# Patient Record
Sex: MALE | Race: BLACK OR AFRICAN AMERICAN | NOT HISPANIC OR LATINO | Employment: PART TIME | URBAN - METROPOLITAN AREA
[De-identification: names, ages, dates, MRNs, and addresses within clinical notes are randomized per-mention and may not be internally consistent; named-entity substitution may affect disease eponyms.]

---

## 2019-06-27 ENCOUNTER — HOSPITAL ENCOUNTER (INPATIENT)
Facility: HOSPITAL | Age: 39
LOS: 2 days | Discharge: HOME | DRG: 065 | End: 2019-06-29
Attending: EMERGENCY MEDICINE | Admitting: HOSPITALIST
Payer: MEDICAID

## 2019-06-27 DIAGNOSIS — R20.0 LEFT ARM NUMBNESS: ICD-10-CM

## 2019-06-27 DIAGNOSIS — R07.9 CHEST PAIN: ICD-10-CM

## 2019-06-27 DIAGNOSIS — Z79.4 TYPE 2 DIABETES MELLITUS WITH HYPEROSMOLARITY WITHOUT COMA, WITH LONG-TERM CURRENT USE OF INSULIN: Primary | Chronic | ICD-10-CM

## 2019-06-27 DIAGNOSIS — N18.30 CKD (CHRONIC KIDNEY DISEASE) STAGE 3, GFR 30-59 ML/MIN: Chronic | ICD-10-CM

## 2019-06-27 DIAGNOSIS — I50.9 HEART FAILURE: ICD-10-CM

## 2019-06-27 DIAGNOSIS — I63.511 ACUTE RIGHT MCA STROKE: ICD-10-CM

## 2019-06-27 DIAGNOSIS — E11.00 TYPE 2 DIABETES MELLITUS WITH HYPEROSMOLARITY WITHOUT COMA, WITH LONG-TERM CURRENT USE OF INSULIN: Primary | Chronic | ICD-10-CM

## 2019-06-27 LAB
ALBUMIN SERPL BCP-MCNC: 3.5 G/DL (ref 3.5–5.2)
ALP SERPL-CCNC: 85 U/L (ref 55–135)
ALT SERPL W/O P-5'-P-CCNC: 13 U/L (ref 10–44)
ANION GAP SERPL CALC-SCNC: 11 MMOL/L (ref 8–16)
AST SERPL-CCNC: 16 U/L (ref 10–40)
BASOPHILS # BLD AUTO: 0.03 K/UL (ref 0–0.2)
BASOPHILS NFR BLD: 0.5 % (ref 0–1.9)
BILIRUB SERPL-MCNC: 0.3 MG/DL (ref 0.1–1)
BUN SERPL-MCNC: 28 MG/DL (ref 6–20)
CALCIUM SERPL-MCNC: 10.8 MG/DL (ref 8.7–10.5)
CHLORIDE SERPL-SCNC: 98 MMOL/L (ref 95–110)
CO2 SERPL-SCNC: 26 MMOL/L (ref 23–29)
CREAT SERPL-MCNC: 2.2 MG/DL (ref 0.5–1.4)
DIFFERENTIAL METHOD: ABNORMAL
EOSINOPHIL # BLD AUTO: 0.1 K/UL (ref 0–0.5)
EOSINOPHIL NFR BLD: 1.5 % (ref 0–8)
ERYTHROCYTE [DISTWIDTH] IN BLOOD BY AUTOMATED COUNT: 13.1 % (ref 11.5–14.5)
EST. GFR  (AFRICAN AMERICAN): 42 ML/MIN/1.73 M^2
EST. GFR  (NON AFRICAN AMERICAN): 36 ML/MIN/1.73 M^2
GLUCOSE SERPL-MCNC: 169 MG/DL (ref 70–110)
HCT VFR BLD AUTO: 38.9 % (ref 40–54)
HGB BLD-MCNC: 13.2 G/DL (ref 14–18)
LYMPHOCYTES # BLD AUTO: 2.1 K/UL (ref 1–4.8)
LYMPHOCYTES NFR BLD: 31.8 % (ref 18–48)
MCH RBC QN AUTO: 27.8 PG (ref 27–31)
MCHC RBC AUTO-ENTMCNC: 33.9 G/DL (ref 32–36)
MCV RBC AUTO: 82 FL (ref 82–98)
MONOCYTES # BLD AUTO: 0.5 K/UL (ref 0.3–1)
MONOCYTES NFR BLD: 6.8 % (ref 4–15)
NEUTROPHILS # BLD AUTO: 3.9 K/UL (ref 1.8–7.7)
NEUTROPHILS NFR BLD: 59.7 % (ref 38–73)
PLATELET # BLD AUTO: 299 K/UL (ref 150–350)
PMV BLD AUTO: 9.9 FL (ref 9.2–12.9)
POTASSIUM SERPL-SCNC: 3.8 MMOL/L (ref 3.5–5.1)
PROT SERPL-MCNC: 7.9 G/DL (ref 6–8.4)
RBC # BLD AUTO: 4.75 M/UL (ref 4.6–6.2)
SODIUM SERPL-SCNC: 135 MMOL/L (ref 136–145)
WBC # BLD AUTO: 6.57 K/UL (ref 3.9–12.7)

## 2019-06-27 PROCEDURE — 11000001 HC ACUTE MED/SURG PRIVATE ROOM

## 2019-06-27 PROCEDURE — 25000003 PHARM REV CODE 250: Performed by: EMERGENCY MEDICINE

## 2019-06-27 PROCEDURE — 99285 EMERGENCY DEPT VISIT HI MDM: CPT | Mod: 25

## 2019-06-27 PROCEDURE — 93010 ELECTROCARDIOGRAM REPORT: CPT | Mod: ,,, | Performed by: INTERNAL MEDICINE

## 2019-06-27 PROCEDURE — 85025 COMPLETE CBC W/AUTO DIFF WBC: CPT

## 2019-06-27 PROCEDURE — 96361 HYDRATE IV INFUSION ADD-ON: CPT

## 2019-06-27 PROCEDURE — 86703 HIV-1/HIV-2 1 RESULT ANTBDY: CPT

## 2019-06-27 PROCEDURE — 36415 COLL VENOUS BLD VENIPUNCTURE: CPT

## 2019-06-27 PROCEDURE — 80320 DRUG SCREEN QUANTALCOHOLS: CPT

## 2019-06-27 PROCEDURE — 93010 EKG 12-LEAD: ICD-10-PCS | Mod: ,,, | Performed by: INTERNAL MEDICINE

## 2019-06-27 PROCEDURE — 80307 DRUG TEST PRSMV CHEM ANLYZR: CPT

## 2019-06-27 PROCEDURE — 84100 ASSAY OF PHOSPHORUS: CPT

## 2019-06-27 PROCEDURE — 80053 COMPREHEN METABOLIC PANEL: CPT

## 2019-06-27 PROCEDURE — 93005 ELECTROCARDIOGRAM TRACING: CPT

## 2019-06-27 PROCEDURE — 81000 URINALYSIS NONAUTO W/SCOPE: CPT | Mod: 59

## 2019-06-27 PROCEDURE — 83735 ASSAY OF MAGNESIUM: CPT

## 2019-06-27 RX ORDER — LOSARTAN POTASSIUM 50 MG/1
50 TABLET ORAL
Status: ON HOLD | COMMUNITY
Start: 2019-05-02 | End: 2019-06-29 | Stop reason: SDUPTHER

## 2019-06-27 RX ORDER — INSULIN ASPART 100 [IU]/ML
15 INJECTION, SOLUTION INTRAVENOUS; SUBCUTANEOUS
Status: ON HOLD | COMMUNITY
Start: 2019-05-20 | End: 2019-06-29 | Stop reason: SDUPTHER

## 2019-06-27 RX ORDER — FUROSEMIDE 40 MG/1
40 TABLET ORAL
Status: ON HOLD | COMMUNITY
Start: 2018-12-31 | End: 2019-06-29 | Stop reason: SDUPTHER

## 2019-06-27 RX ORDER — ATORVASTATIN CALCIUM 20 MG/1
20 TABLET, FILM COATED ORAL
Status: ON HOLD | COMMUNITY
Start: 2019-05-06 | End: 2019-06-29 | Stop reason: SDUPTHER

## 2019-06-27 RX ORDER — AMLODIPINE BESYLATE 10 MG/1
10 TABLET ORAL
Status: ON HOLD | COMMUNITY
Start: 2019-05-02 | End: 2019-06-29 | Stop reason: SDUPTHER

## 2019-06-27 RX ORDER — ASPIRIN 81 MG/1
81 TABLET ORAL
COMMUNITY
Start: 2018-12-14

## 2019-06-27 RX ORDER — CARVEDILOL 25 MG/1
25 TABLET ORAL
Status: ON HOLD | COMMUNITY
Start: 2019-05-02 | End: 2019-06-29 | Stop reason: SDUPTHER

## 2019-06-27 RX ORDER — HYDROCHLOROTHIAZIDE 12.5 MG/1
12.5 CAPSULE ORAL DAILY
COMMUNITY

## 2019-06-27 RX ADMIN — SODIUM CHLORIDE 1000 ML: 0.9 INJECTION, SOLUTION INTRAVENOUS at 11:06

## 2019-06-28 PROBLEM — I27.20 PULMONARY HYPERTENSION: Status: ACTIVE | Noted: 2018-12-12

## 2019-06-28 PROBLEM — I63.511 ACUTE RIGHT MCA STROKE: Status: ACTIVE | Noted: 2019-06-28

## 2019-06-28 PROBLEM — I63.9 CVA (CEREBROVASCULAR ACCIDENT): Status: ACTIVE | Noted: 2019-06-28

## 2019-06-28 PROBLEM — R07.9 CHEST PAIN: Status: ACTIVE | Noted: 2019-06-28

## 2019-06-28 PROBLEM — R20.0 LEFT ARM NUMBNESS: Status: ACTIVE | Noted: 2019-06-28

## 2019-06-28 PROBLEM — I50.22 CHRONIC SYSTOLIC CONGESTIVE HEART FAILURE: Status: ACTIVE | Noted: 2018-12-12

## 2019-06-28 PROBLEM — G93.40 ENCEPHALOPATHY: Status: ACTIVE | Noted: 2019-06-28

## 2019-06-28 PROBLEM — E55.9 VITAMIN D DEFICIENCY: Status: ACTIVE | Noted: 2017-04-10

## 2019-06-28 PROBLEM — N18.30 CKD (CHRONIC KIDNEY DISEASE) STAGE 3, GFR 30-59 ML/MIN: Status: ACTIVE | Noted: 2019-06-28

## 2019-06-28 PROBLEM — I50.22 CHRONIC SYSTOLIC CONGESTIVE HEART FAILURE: Chronic | Status: ACTIVE | Noted: 2018-12-12

## 2019-06-28 PROBLEM — N18.30 CKD (CHRONIC KIDNEY DISEASE) STAGE 3, GFR 30-59 ML/MIN: Chronic | Status: ACTIVE | Noted: 2019-06-28

## 2019-06-28 PROBLEM — I10 ESSENTIAL HYPERTENSION: Chronic | Status: ACTIVE | Noted: 2018-12-20

## 2019-06-28 PROBLEM — E11.9 TYPE 2 DIABETES MELLITUS: Status: ACTIVE | Noted: 2019-06-28

## 2019-06-28 PROBLEM — I10 ESSENTIAL HYPERTENSION: Status: ACTIVE | Noted: 2018-12-20

## 2019-06-28 LAB
ALBUMIN SERPL BCP-MCNC: 3.2 G/DL (ref 3.5–5.2)
AMPHET+METHAMPHET UR QL: NEGATIVE
ANION GAP SERPL CALC-SCNC: 12 MMOL/L (ref 8–16)
BACTERIA #/AREA URNS HPF: NORMAL /HPF
BARBITURATES UR QL SCN>200 NG/ML: NEGATIVE
BASOPHILS # BLD AUTO: 0.02 K/UL (ref 0–0.2)
BASOPHILS NFR BLD: 0.3 % (ref 0–1.9)
BENZODIAZ UR QL SCN>200 NG/ML: NEGATIVE
BILIRUB UR QL STRIP: NEGATIVE
BUN SERPL-MCNC: 27 MG/DL (ref 6–20)
BZE UR QL SCN: NEGATIVE
CALCIUM SERPL-MCNC: 10.2 MG/DL (ref 8.7–10.5)
CANNABINOIDS UR QL SCN: NEGATIVE
CHLORIDE SERPL-SCNC: 101 MMOL/L (ref 95–110)
CHOLEST SERPL-MCNC: 219 MG/DL (ref 120–199)
CHOLEST/HDLC SERPL: 8.8 {RATIO} (ref 2–5)
CLARITY UR: CLEAR
CO2 SERPL-SCNC: 25 MMOL/L (ref 23–29)
COLOR UR: YELLOW
CREAT SERPL-MCNC: 2 MG/DL (ref 0.5–1.4)
CREAT UR-MCNC: 32.4 MG/DL (ref 23–375)
DIASTOLIC DYSFUNCTION: YES
DIFFERENTIAL METHOD: ABNORMAL
EOSINOPHIL # BLD AUTO: 0.1 K/UL (ref 0–0.5)
EOSINOPHIL NFR BLD: 1 % (ref 0–8)
ERYTHROCYTE [DISTWIDTH] IN BLOOD BY AUTOMATED COUNT: 13.1 % (ref 11.5–14.5)
EST. GFR  (AFRICAN AMERICAN): 47 ML/MIN/1.73 M^2
EST. GFR  (NON AFRICAN AMERICAN): 41 ML/MIN/1.73 M^2
ESTIMATED AVG GLUCOSE: 232 MG/DL (ref 68–131)
ETHANOL SERPL-MCNC: <10 MG/DL
GLUCOSE SERPL-MCNC: 139 MG/DL (ref 70–110)
GLUCOSE UR QL STRIP: ABNORMAL
HBA1C MFR BLD HPLC: 9.7 % (ref 4–5.6)
HCT VFR BLD AUTO: 37.8 % (ref 40–54)
HDLC SERPL-MCNC: 25 MG/DL (ref 40–75)
HDLC SERPL: 11.4 % (ref 20–50)
HGB BLD-MCNC: 12.4 G/DL (ref 14–18)
HGB UR QL STRIP: ABNORMAL
HIV 1+2 AB+HIV1 P24 AG SERPL QL IA: NEGATIVE
HYALINE CASTS #/AREA URNS LPF: 0 /LPF
KETONES UR QL STRIP: NEGATIVE
LDLC SERPL CALC-MCNC: 160 MG/DL (ref 63–159)
LEUKOCYTE ESTERASE UR QL STRIP: NEGATIVE
LYMPHOCYTES # BLD AUTO: 2.1 K/UL (ref 1–4.8)
LYMPHOCYTES NFR BLD: 34.1 % (ref 18–48)
MAGNESIUM SERPL-MCNC: 1.4 MG/DL (ref 1.6–2.6)
MAGNESIUM SERPL-MCNC: 1.7 MG/DL (ref 1.6–2.6)
MCH RBC QN AUTO: 27.3 PG (ref 27–31)
MCHC RBC AUTO-ENTMCNC: 32.8 G/DL (ref 32–36)
MCV RBC AUTO: 83 FL (ref 82–98)
METHADONE UR QL SCN>300 NG/ML: NEGATIVE
MICROSCOPIC COMMENT: NORMAL
MITRAL VALVE MOBILITY: NORMAL
MONOCYTES # BLD AUTO: 0.4 K/UL (ref 0.3–1)
MONOCYTES NFR BLD: 7.2 % (ref 4–15)
NEUTROPHILS # BLD AUTO: 3.5 K/UL (ref 1.8–7.7)
NEUTROPHILS NFR BLD: 57.7 % (ref 38–73)
NITRITE UR QL STRIP: NEGATIVE
NONHDLC SERPL-MCNC: 194 MG/DL
OPIATES UR QL SCN: NEGATIVE
PCP UR QL SCN>25 NG/ML: NEGATIVE
PH UR STRIP: 6 [PH] (ref 5–8)
PHOSPHATE SERPL-MCNC: 2.7 MG/DL (ref 2.7–4.5)
PHOSPHATE SERPL-MCNC: 3.9 MG/DL (ref 2.7–4.5)
PHOSPHATE SERPL-MCNC: 3.9 MG/DL (ref 2.7–4.5)
PLATELET # BLD AUTO: 245 K/UL (ref 150–350)
PMV BLD AUTO: 10 FL (ref 9.2–12.9)
POCT GLUCOSE: 307 MG/DL (ref 70–110)
POCT GLUCOSE: 341 MG/DL (ref 70–110)
POTASSIUM SERPL-SCNC: 4 MMOL/L (ref 3.5–5.1)
PROT UR QL STRIP: ABNORMAL
RBC # BLD AUTO: 4.55 M/UL (ref 4.6–6.2)
RBC #/AREA URNS HPF: 1 /HPF (ref 0–4)
RETIRED EF AND QEF - SEE NOTES: 60 (ref 55–65)
SODIUM SERPL-SCNC: 138 MMOL/L (ref 136–145)
SP GR UR STRIP: 1.01 (ref 1–1.03)
SQUAMOUS #/AREA URNS HPF: 1 /HPF
T4 FREE SERPL-MCNC: 1.08 NG/DL (ref 0.71–1.51)
TOXICOLOGY INFORMATION: NORMAL
TRIGL SERPL-MCNC: 170 MG/DL (ref 30–150)
TSH SERPL DL<=0.005 MIU/L-ACNC: 0.08 UIU/ML (ref 0.4–4)
URN SPEC COLLECT METH UR: ABNORMAL
UROBILINOGEN UR STRIP-ACNC: NEGATIVE EU/DL
WBC # BLD AUTO: 6.07 K/UL (ref 3.9–12.7)
WBC #/AREA URNS HPF: 0 /HPF (ref 0–5)

## 2019-06-28 PROCEDURE — 99233 SBSQ HOSP IP/OBS HIGH 50: CPT | Mod: ,,, | Performed by: PSYCHIATRY & NEUROLOGY

## 2019-06-28 PROCEDURE — 96376 TX/PRO/DX INJ SAME DRUG ADON: CPT | Performed by: EMERGENCY MEDICINE

## 2019-06-28 PROCEDURE — 80069 RENAL FUNCTION PANEL: CPT

## 2019-06-28 PROCEDURE — 83735 ASSAY OF MAGNESIUM: CPT

## 2019-06-28 PROCEDURE — 63600175 PHARM REV CODE 636 W HCPCS: Performed by: NURSE PRACTITIONER

## 2019-06-28 PROCEDURE — C8929 TTE W OR WO FOL WCON,DOPPLER: HCPCS

## 2019-06-28 PROCEDURE — 83036 HEMOGLOBIN GLYCOSYLATED A1C: CPT

## 2019-06-28 PROCEDURE — 84439 ASSAY OF FREE THYROXINE: CPT

## 2019-06-28 PROCEDURE — 96372 THER/PROPH/DIAG INJ SC/IM: CPT | Mod: 59 | Performed by: EMERGENCY MEDICINE

## 2019-06-28 PROCEDURE — 92610 EVALUATE SWALLOWING FUNCTION: CPT

## 2019-06-28 PROCEDURE — 21400001 HC TELEMETRY ROOM

## 2019-06-28 PROCEDURE — 25000003 PHARM REV CODE 250: Performed by: NURSE PRACTITIONER

## 2019-06-28 PROCEDURE — 25000003 PHARM REV CODE 250: Performed by: HOSPITALIST

## 2019-06-28 PROCEDURE — 96374 THER/PROPH/DIAG INJ IV PUSH: CPT | Performed by: EMERGENCY MEDICINE

## 2019-06-28 PROCEDURE — 99233 PR SUBSEQUENT HOSPITAL CARE,LEVL III: ICD-10-PCS | Mod: ,,, | Performed by: PSYCHIATRY & NEUROLOGY

## 2019-06-28 PROCEDURE — 92523 SPEECH SOUND LANG COMPREHEN: CPT

## 2019-06-28 PROCEDURE — 80061 LIPID PANEL: CPT

## 2019-06-28 PROCEDURE — 93306 TTE W/DOPPLER COMPLETE: CPT | Mod: 26,,, | Performed by: INTERNAL MEDICINE

## 2019-06-28 PROCEDURE — 97116 GAIT TRAINING THERAPY: CPT

## 2019-06-28 PROCEDURE — S5571 INSULIN DISPOS PEN 3 ML: HCPCS | Performed by: NURSE PRACTITIONER

## 2019-06-28 PROCEDURE — 93306 2D ECHO WITH COLOR FLOW DOPPLER: ICD-10-PCS | Mod: 26,,, | Performed by: INTERNAL MEDICINE

## 2019-06-28 PROCEDURE — 96361 HYDRATE IV INFUSION ADD-ON: CPT | Performed by: EMERGENCY MEDICINE

## 2019-06-28 PROCEDURE — 97162 PT EVAL MOD COMPLEX 30 MIN: CPT

## 2019-06-28 PROCEDURE — 84443 ASSAY THYROID STIM HORMONE: CPT

## 2019-06-28 PROCEDURE — 85025 COMPLETE CBC W/AUTO DIFF WBC: CPT

## 2019-06-28 PROCEDURE — 36415 COLL VENOUS BLD VENIPUNCTURE: CPT

## 2019-06-28 RX ORDER — LOSARTAN POTASSIUM 50 MG/1
50 TABLET ORAL DAILY
Status: DISCONTINUED | OUTPATIENT
Start: 2019-06-28 | End: 2019-06-29 | Stop reason: HOSPADM

## 2019-06-28 RX ORDER — CLOPIDOGREL BISULFATE 75 MG/1
75 TABLET ORAL DAILY
Status: DISCONTINUED | OUTPATIENT
Start: 2019-06-28 | End: 2019-06-29 | Stop reason: HOSPADM

## 2019-06-28 RX ORDER — GLUCAGON 1 MG
1 KIT INJECTION
Status: DISCONTINUED | OUTPATIENT
Start: 2019-06-28 | End: 2019-06-29 | Stop reason: HOSPADM

## 2019-06-28 RX ORDER — ACETAMINOPHEN 325 MG/1
650 TABLET ORAL EVERY 6 HOURS PRN
Status: DISCONTINUED | OUTPATIENT
Start: 2019-06-28 | End: 2019-06-29 | Stop reason: HOSPADM

## 2019-06-28 RX ORDER — CARVEDILOL 12.5 MG/1
25 TABLET ORAL 2 TIMES DAILY
Status: DISCONTINUED | OUTPATIENT
Start: 2019-06-28 | End: 2019-06-29 | Stop reason: HOSPADM

## 2019-06-28 RX ORDER — SODIUM CHLORIDE 0.9 % (FLUSH) 0.9 %
5 SYRINGE (ML) INJECTION
Status: DISCONTINUED | OUTPATIENT
Start: 2019-06-28 | End: 2019-06-29 | Stop reason: HOSPADM

## 2019-06-28 RX ORDER — HEPARIN SODIUM 5000 [USP'U]/ML
5000 INJECTION, SOLUTION INTRAVENOUS; SUBCUTANEOUS EVERY 8 HOURS
Status: DISCONTINUED | OUTPATIENT
Start: 2019-06-28 | End: 2019-06-29 | Stop reason: HOSPADM

## 2019-06-28 RX ORDER — ONDANSETRON 2 MG/ML
4 INJECTION INTRAMUSCULAR; INTRAVENOUS EVERY 8 HOURS PRN
Status: DISCONTINUED | OUTPATIENT
Start: 2019-06-28 | End: 2019-06-29 | Stop reason: HOSPADM

## 2019-06-28 RX ORDER — ASPIRIN 81 MG/1
81 TABLET ORAL DAILY
Status: DISCONTINUED | OUTPATIENT
Start: 2019-06-28 | End: 2019-06-29 | Stop reason: HOSPADM

## 2019-06-28 RX ORDER — INSULIN ASPART 100 [IU]/ML
1-10 INJECTION, SOLUTION INTRAVENOUS; SUBCUTANEOUS
Status: DISCONTINUED | OUTPATIENT
Start: 2019-06-28 | End: 2019-06-29 | Stop reason: HOSPADM

## 2019-06-28 RX ORDER — ATORVASTATIN CALCIUM 40 MG/1
80 TABLET, FILM COATED ORAL DAILY
Status: DISCONTINUED | OUTPATIENT
Start: 2019-06-29 | End: 2019-06-29 | Stop reason: HOSPADM

## 2019-06-28 RX ORDER — HYDRALAZINE HYDROCHLORIDE 20 MG/ML
10 INJECTION INTRAMUSCULAR; INTRAVENOUS EVERY 8 HOURS PRN
Status: DISCONTINUED | OUTPATIENT
Start: 2019-06-28 | End: 2019-06-28

## 2019-06-28 RX ORDER — LORAZEPAM 1 MG/1
2 TABLET ORAL ONCE
Status: DISCONTINUED | OUTPATIENT
Start: 2019-06-28 | End: 2019-06-28

## 2019-06-28 RX ORDER — IBUPROFEN 200 MG
16 TABLET ORAL
Status: DISCONTINUED | OUTPATIENT
Start: 2019-06-28 | End: 2019-06-29 | Stop reason: HOSPADM

## 2019-06-28 RX ORDER — AMLODIPINE BESYLATE 10 MG/1
10 TABLET ORAL DAILY
Status: DISCONTINUED | OUTPATIENT
Start: 2019-06-28 | End: 2019-06-29 | Stop reason: HOSPADM

## 2019-06-28 RX ORDER — FUROSEMIDE 40 MG/1
40 TABLET ORAL DAILY
Status: DISCONTINUED | OUTPATIENT
Start: 2019-06-28 | End: 2019-06-29 | Stop reason: HOSPADM

## 2019-06-28 RX ORDER — IBUPROFEN 200 MG
24 TABLET ORAL
Status: DISCONTINUED | OUTPATIENT
Start: 2019-06-28 | End: 2019-06-29 | Stop reason: HOSPADM

## 2019-06-28 RX ORDER — LOSARTAN POTASSIUM 50 MG/1
50 TABLET ORAL DAILY
Status: DISCONTINUED | OUTPATIENT
Start: 2019-06-28 | End: 2019-06-28

## 2019-06-28 RX ORDER — CARVEDILOL 12.5 MG/1
25 TABLET ORAL 2 TIMES DAILY
Status: DISCONTINUED | OUTPATIENT
Start: 2019-06-28 | End: 2019-06-28

## 2019-06-28 RX ORDER — ATORVASTATIN CALCIUM 40 MG/1
40 TABLET, FILM COATED ORAL DAILY
Status: DISCONTINUED | OUTPATIENT
Start: 2019-06-28 | End: 2019-06-28

## 2019-06-28 RX ADMIN — INSULIN ASPART 8 UNITS: 100 INJECTION, SOLUTION INTRAVENOUS; SUBCUTANEOUS at 12:06

## 2019-06-28 RX ADMIN — LOSARTAN POTASSIUM 50 MG: 50 TABLET, FILM COATED ORAL at 01:06

## 2019-06-28 RX ADMIN — ASPIRIN 81 MG: 81 TABLET, COATED ORAL at 08:06

## 2019-06-28 RX ADMIN — LORAZEPAM 1 MG: 2 INJECTION INTRAMUSCULAR; INTRAVENOUS at 09:06

## 2019-06-28 RX ADMIN — AMLODIPINE BESYLATE 10 MG: 10 TABLET ORAL at 08:06

## 2019-06-28 RX ADMIN — INSULIN ASPART 8 UNITS: 100 INJECTION, SOLUTION INTRAVENOUS; SUBCUTANEOUS at 04:06

## 2019-06-28 RX ADMIN — FUROSEMIDE 40 MG: 40 TABLET ORAL at 08:06

## 2019-06-28 RX ADMIN — HEPARIN SODIUM 5000 UNITS: 5000 INJECTION, SOLUTION INTRAVENOUS; SUBCUTANEOUS at 10:06

## 2019-06-28 RX ADMIN — HEPARIN SODIUM 5000 UNITS: 5000 INJECTION, SOLUTION INTRAVENOUS; SUBCUTANEOUS at 01:06

## 2019-06-28 RX ADMIN — CLOPIDOGREL BISULFATE 75 MG: 75 TABLET ORAL at 06:06

## 2019-06-28 RX ADMIN — CARVEDILOL 25 MG: 12.5 TABLET, FILM COATED ORAL at 01:06

## 2019-06-28 RX ADMIN — HYDRALAZINE HYDROCHLORIDE 10 MG: 20 INJECTION, SOLUTION INTRAMUSCULAR; INTRAVENOUS at 08:06

## 2019-06-28 RX ADMIN — ATORVASTATIN CALCIUM 40 MG: 40 TABLET, FILM COATED ORAL at 08:06

## 2019-06-28 RX ADMIN — CARVEDILOL 25 MG: 12.5 TABLET, FILM COATED ORAL at 08:06

## 2019-06-28 RX ADMIN — CARVEDILOL 25 MG: 12.5 TABLET, FILM COATED ORAL at 09:06

## 2019-06-28 RX ADMIN — INSULIN DETEMIR 15 UNITS: 100 INJECTION, SOLUTION SUBCUTANEOUS at 08:06

## 2019-06-28 NOTE — ASSESSMENT & PLAN NOTE
- Initial creatinine 2.2 (baseline 2-2.5).  - Avoid nephrotoxic agents.  - BP control.  - Follow labs.

## 2019-06-28 NOTE — ASSESSMENT & PLAN NOTE
- Resolved PTA.  Possibly hypertensive encephalopathy.  - Neuro checks.  - BP control.  - CT of the head showed chronic infarct.  CVA work-up as below.  - Further recs pending clinical course.

## 2019-06-28 NOTE — ASSESSMENT & PLAN NOTE
- Patient underwent CMPY work-up in 12/2018 at [facility].  LHC showed normal coronaries.  TTE showed CLVH with LVEF of 25-30%.  Patient failed to f/u with [facility] for repeat echo.  - Clinically compensated on admission.  - Continue BB, ARB, and Lasix.  - Strict I&O's, daily weights, Na/fluid restriction.  - BP control.    - Repeat 2D echo shows normal left ventricular systolic function (EF 60-65%) and impaired LV relaxation, normal LAP (grade 1 diastolic dysfunction).   -Continue current medications

## 2019-06-28 NOTE — ASSESSMENT & PLAN NOTE
- Patient underwent CMPY work-up in 12/2018 at [facility].  LHC showed normal coronaries.  TTE showed CLVH with LVEF of 25-30%.  Patient failed to f/u with [facility] for repeat echo.  - Clinically compensated on admission.  - Continue BB, ARB, and Lasix.  - Strict I&O's, daily weights, Na/fluid restriction.  - BP control.  - Repeat 2D echo.

## 2019-06-28 NOTE — ED NOTES
Consulted CLAIRE Alonzo in regards to placing CTA for CVA r/o. CLAIRE states to withhold until MD evaluates pt.

## 2019-06-28 NOTE — ED PROVIDER NOTES
"SCRIBE #1 NOTE: I, Madison Zacarias, am scribing for, and in the presence of, Jennifer Nina Do, MD. I have scribed the entire note.       History     Chief Complaint   Patient presents with    Numbness     pt reports numbness to LUE x 2 wks, causing pain when lfting arm.     Review of patient's allergies indicates:  No Known Allergies      History of Present Illness     HPI    6/27/2019, 9:37 PM  History obtained from the wife and patient      History of Present Illness: Tyrone Malhotra is a 38 y.o. male patient with a PMHx of DM and HTN who presents to the Emergency Department for evaluation of "shocking" LUE numbness and weakness which onset gradually 2 days ago. Pt is c/o pain when lifting the L arm that radiates from his L elbow to his fingertips. Wife reports abnormal behavior by the pt such as putting his socks in the freezer and running into walls. Pt states he is unable to hold things in his L hand. Wife believes sxs a related to a heat stroke after pt walked 5 minutes to work. Symptoms are constant and moderate in severity. No mitigating or exacerbating factors reported. Associated sxs include confusion and speech difficulty. Patient denies any fever, chills, SOB, CP, n/v, neck pain, neck stiffness, dizziness, HA, seizures, trouble swallowing, facial asymmetry, LOC, and all other sxs at this time. No prior Tx reported. No further complaints or concerns at this time.       Arrival mode: Personal vehicle    PCP: Primary Doctor No        Past Medical History:  Past Medical History:   Diagnosis Date    Chronic systolic heart failure     Decreased renal function     Diabetes     HTN (hypertension)     Hyperlipemia        Past Surgical History:  Past Surgical History:   Procedure Laterality Date    LEFT HEART CATHETERIZATION  2018         Family History:  History reviewed. No pertinent history.     Social History:  Social History     Tobacco Use    Smoking status: Never Smoker    Smokeless " tobacco: Never Used   Substance and Sexual Activity    Alcohol use: Never     Frequency: Never    Drug use: Never    Sexual activity: Unknown        Review of Systems     Review of Systems   Constitutional: Negative for chills and fever.   HENT: Negative for sore throat and trouble swallowing.    Respiratory: Negative for cough and shortness of breath.    Cardiovascular: Negative for chest pain.   Gastrointestinal: Negative for abdominal pain, diarrhea, nausea and vomiting.   Genitourinary: Negative for dysuria.   Musculoskeletal: Negative for back pain, neck pain and neck stiffness.   Skin: Negative for rash.   Neurological: Positive for speech difficulty, weakness (LUE) and numbness (LUE). Negative for dizziness, seizures, syncope, facial asymmetry and headaches.   Hematological: Does not bruise/bleed easily.   Psychiatric/Behavioral: Positive for confusion.   All other systems reviewed and are negative.       Physical Exam     Initial Vitals [06/27/19 2052]   BP Pulse Resp Temp SpO2   (!) 186/102 94 20 98.4 °F (36.9 °C) 96 %      MAP       --          Physical Exam  Nursing Notes and Vital Signs Reviewed.  Constitutional: Patient is in no acute distress. Well-developed and well-nourished.  Head: Atraumatic. Normocephalic.  Eyes: EOM intact. Conjunctivae are not pale. No scleral icterus.  ENT: Mucous membranes are moist. Oropharynx is clear and symmetric.    Neck: Supple. Full ROM. No lymphadenopathy.  Cardiovascular: Regular rate. Regular rhythm. No murmurs, rubs, or gallops. Distal pulses are 2+ and symmetric.  Pulmonary/Chest: No respiratory distress. Clear to auscultation bilaterally. No wheezing or rales.  Abdominal: Soft and non-distended.  There is no tenderness.  No rebound, guarding, or rigidity.   Musculoskeletal: Moves all extremities. No obvious deformities. No edema. No calf tenderness.  Skin: Warm and dry.  Neurological: Patient is alert and oriented to person, place and time. Cranial nerves  "II-XII are intact. Minimal weakness to L sided face. Good  strength. L  strength weaker than R. There is no pronator drift of outstretched arms. Light touch sense is intact. Speech is clear and normal. No dysarthria. No acute focal neurological deficits noted.  Psychiatric: Normal affect. Good eye contact. Appropriate in content.     ED Course   Procedures  ED Vital Signs:  Vitals:    06/27/19 2052 06/27/19 2139 06/27/19 2140 06/27/19 2210   BP: (!) 186/102  (!) 189/107 (!) 176/89   Pulse: 94 88 84 85   Resp: 20 19 (!) 21   Temp: 98.4 °F (36.9 °C)      TempSrc: Oral      SpO2: 96%  100% 99%   Weight: (!) 156.5 kg (345 lb 0.3 oz)      Height: 5' 9" (1.753 m)       06/27/19 2220 06/27/19 2300 06/27/19 2335 06/28/19 0000   BP:  (!) 175/88 (!) 170/90 (!) 164/91   Pulse: 80 81 80 77   Resp:  19 20 19   Temp:       TempSrc:       SpO2:  99% 99% 99%   Weight:       Height:           Abnormal Lab Results:  Labs Reviewed   CBC W/ AUTO DIFFERENTIAL - Abnormal; Notable for the following components:       Result Value    Hemoglobin 13.2 (*)     Hematocrit 38.9 (*)     All other components within normal limits   COMPREHENSIVE METABOLIC PANEL - Abnormal; Notable for the following components:    Sodium 135 (*)     Glucose 169 (*)     BUN, Bld 28 (*)     Creatinine 2.2 (*)     Calcium 10.8 (*)     eGFR if  42 (*)     eGFR if non  36 (*)     All other components within normal limits   URINALYSIS, REFLEX TO URINE CULTURE - Abnormal; Notable for the following components:    Protein, UA 1+ (*)     Glucose, UA Trace (*)     Occult Blood UA Trace (*)     All other components within normal limits    Narrative:     Preferred Collection Type->Urine, Clean Catch   DRUG SCREEN PANEL, URINE EMERGENCY    Narrative:     Preferred Collection Type->Urine, Clean Catch   ALCOHOL,MEDICAL (ETHANOL)   ALCOHOL,MEDICAL (ETHANOL)   URINALYSIS MICROSCOPIC    Narrative:     Preferred Collection Type->Urine, Clean " Catch   HIV 1 / 2 ANTIBODY        All Lab Results:  Results for orders placed or performed during the hospital encounter of 06/27/19   CBC auto differential   Result Value Ref Range    WBC 6.57 3.90 - 12.70 K/uL    RBC 4.75 4.60 - 6.20 M/uL    Hemoglobin 13.2 (L) 14.0 - 18.0 g/dL    Hematocrit 38.9 (L) 40.0 - 54.0 %    Mean Corpuscular Volume 82 82 - 98 fL    Mean Corpuscular Hemoglobin 27.8 27.0 - 31.0 pg    Mean Corpuscular Hemoglobin Conc 33.9 32.0 - 36.0 g/dL    RDW 13.1 11.5 - 14.5 %    Platelets 299 150 - 350 K/uL    MPV 9.9 9.2 - 12.9 fL    Gran # (ANC) 3.9 1.8 - 7.7 K/uL    Lymph # 2.1 1.0 - 4.8 K/uL    Mono # 0.5 0.3 - 1.0 K/uL    Eos # 0.1 0.0 - 0.5 K/uL    Baso # 0.03 0.00 - 0.20 K/uL    Gran% 59.7 38.0 - 73.0 %    Lymph% 31.8 18.0 - 48.0 %    Mono% 6.8 4.0 - 15.0 %    Eosinophil% 1.5 0.0 - 8.0 %    Basophil% 0.5 0.0 - 1.9 %    Differential Method Automated    Comprehensive metabolic panel   Result Value Ref Range    Sodium 135 (L) 136 - 145 mmol/L    Potassium 3.8 3.5 - 5.1 mmol/L    Chloride 98 95 - 110 mmol/L    CO2 26 23 - 29 mmol/L    Glucose 169 (H) 70 - 110 mg/dL    BUN, Bld 28 (H) 6 - 20 mg/dL    Creatinine 2.2 (H) 0.5 - 1.4 mg/dL    Calcium 10.8 (H) 8.7 - 10.5 mg/dL    Total Protein 7.9 6.0 - 8.4 g/dL    Albumin 3.5 3.5 - 5.2 g/dL    Total Bilirubin 0.3 0.1 - 1.0 mg/dL    Alkaline Phosphatase 85 55 - 135 U/L    AST 16 10 - 40 U/L    ALT 13 10 - 44 U/L    Anion Gap 11 8 - 16 mmol/L    eGFR if African American 42 (A) >60 mL/min/1.73 m^2    eGFR if non African American 36 (A) >60 mL/min/1.73 m^2   Urinalysis, Reflex to Urine Culture Urine, Clean Catch   Result Value Ref Range    Specimen UA Urine, Clean Catch     Color, UA Yellow Yellow, Straw, Myriam    Appearance, UA Clear Clear    pH, UA 6.0 5.0 - 8.0    Specific Gravity, UA 1.010 1.005 - 1.030    Protein, UA 1+ (A) Negative    Glucose, UA Trace (A) Negative    Ketones, UA Negative Negative    Bilirubin (UA) Negative Negative    Occult Blood UA  Trace (A) Negative    Nitrite, UA Negative Negative    Urobilinogen, UA Negative <2.0 EU/dL    Leukocytes, UA Negative Negative   Drug screen panel, emergency   Result Value Ref Range    Benzodiazepines Negative     Methadone metabolites Negative     Cocaine (Metab.) Negative     Opiate Scrn, Ur Negative     Barbiturate Screen, Ur Negative     Amphetamine Screen, Ur Negative     THC Negative     Phencyclidine Negative     Creatinine, Random Ur 32.4 23.0 - 375.0 mg/dL    Toxicology Information SEE COMMENT    Ethanol   Result Value Ref Range    Alcohol, Medical, Serum <10 <10 mg/dL   Urinalysis Microscopic   Result Value Ref Range    RBC, UA 1 0 - 4 /hpf    WBC, UA 0 0 - 5 /hpf    Bacteria Rare None-Occ /hpf    Squam Epithel, UA 1 /hpf    Hyaline Casts, UA 0 0-1/lpf /lpf    Microscopic Comment SEE COMMENT        Imaging Results:  Imaging Results          CT Head Without Contrast (Final result)  Result time 06/27/19 22:21:13    Final result by Kenrick Minaya MD (06/27/19 22:21:13)                 Impression:      1. No intracranial hemorrhage or mass.  2. Right frontal lobe infarct which is either subacute or old.  Please see above discussion in the findings section.  All CT scans at this facility are performed  using dose modulation techniques as appropriate to performed exam including the following:  automated exposure control; adjustment of mA and/or kV according to the patients size (this includes techniques or standardized protocols for targeted exams where dose is matched to indication/reason for exam: i.e. extremities or head);  iterative reconstruction technique.      Electronically signed by: Kenrick Minaya MD  Date:    06/27/2019  Time:    22:21             Narrative:    EXAMINATION:  CT HEAD WITHOUT CONTRAST    CLINICAL HISTORY:  Focal neuro deficit, new, fixed or worsening, >6 hours.  Numbness of left upper extremity for 2 weeks.    TECHNIQUE:  Axial CT imaging was performed through the head without  intravenous contrast.    COMPARISON:  None    FINDINGS:  No hydrocephalus, midline shift, mass effect, or acute intracranial hemorrhage. There is a wedge-shaped area of marked decreased density in the right frontal lobe from infarction.  The age of this infarction is either subacute or old.  The frontal horn of the right lateral ventricle is asymmetrically larger suggesting perhaps ex vacuo dilatation which would make this infarct old.  Basilar cisterns and posterior fossa are normal. The visualized paranasal sinuses and mastoid air cells are clear. The skull is intact.                               X-Ray Chest PA And Lateral (Final result)  Result time 06/27/19 22:08:52    Final result by Kenrick Minaya MD (06/27/19 22:08:52)                 Impression:      Negative chest radiograph.      Electronically signed by: Kenrick Minaya MD  Date:    06/27/2019  Time:    22:08             Narrative:    EXAMINATION:  XR CHEST PA AND LATERAL    CLINICAL HISTORY:  Chest Pain;    COMPARISON:  None    FINDINGS:  The lungs are clear. The cardiac silhouette is within normal limits. No pleural effusion or pneumothorax. The bones are intact.                                 The EKG was ordered, reviewed, and independently interpreted by the ED provider.  Interpretation time: 2220  Rate: 80 BPM  Rhythm: normal sinus rhythm  Interpretation: Possible left atrial enlargement. LAD. LVH. Cannot rule out septal infarct. No STEMI.         The Emergency Provider reviewed the vital signs and test results, which are outlined above.     ED Discussion     11:25 PM: Discussed pt's case with Dr. Morales ([facility]) who recommends a drug screen.    12:10 AM: Discussed case with Dr. Morales ([facility]). Dr. Morales agrees with current care and management of pt and accepts admission.   Admitting Service: Hospital Medicine  Admitting Physician: Dr. Morales  Admit to: Obs/Tele    12:17 AM: Re-evaluated pt. I have discussed test results, shared  treatment plan, and the need for admission with patient and family at bedside. Pt and family express understanding at this time and agree with all information. All questions answered. Pt and family have no further questions or concerns at this time. Pt is ready for admit.      ED Medication(s):  Medications   sodium chloride 0.9% bolus 1,000 mL (1,000 mLs Intravenous New Bag 6/27/19 2328)   sodium chloride 0.9% bolus 1,000 mL (1,000 mLs Intravenous New Bag 6/27/19 2328)       New Prescriptions    No medications on file                 Medical Decision Making:   Clinical Tests:   Lab Tests: Ordered and Reviewed  Radiological Study: Ordered and Reviewed  Medical Tests: Ordered and Reviewed             Scribe Attestation:   Scribe #1: I performed the above scribed service and the documentation accurately describes the services I performed. I attest to the accuracy of the note.     Attending:   Physician Attestation Statement for Scribe #1: I, Jennifer Nina Do, MD, personally performed the services described in this documentation, as scribed by Madison Adames, in my presence, and it is both accurate and complete.           Clinical Impression       ICD-10-CM ICD-9-CM   1. Chest pain R07.9 786.50   2. Left arm numbness R20.0 782.0       Disposition:   Disposition: Placed in Observation  Condition: Stable         Jennifer Nina Do, MD  06/28/19 0043

## 2019-06-28 NOTE — PT/OT/SLP EVAL
Speech Language Pathology Evaluation  Cognitive/Bedside Swallow    Patient Name:  Tyrone Malhotra   MRN:  [id number]  Admitting Diagnosis: Acute right MCA stroke    Recommendations:                  General Recommendations:  Speech/language therapy  Diet recommendations:  Regular, Thin   Aspiration Precautions: HOB to 90 degrees and Remain upright 30 minutes post meal   General Precautions: Standard, fall  Communication strategies:  none    History:     Past Medical History:   Diagnosis Date    CHF (congestive heart failure)     Chronic systolic heart failure     Decreased renal function     Diabetes     HTN (hypertension)     Hyperlipemia     Hyperlipidemia     Noncompliance     Obesity     Pulmonary hypertension        Past Surgical History:   Procedure Laterality Date    CARDIAC CATHETERIZATION  12/2018    Normal coronaries    LEFT HEART CATHETERIZATION  2018       Social History: Patient lives with his wife and reports being Independent with ADLs.  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  He has no h/o dysphagia and eating a regular diet PTA      Subjective     Pt was seen at bedside with his wife present.   Patient goals: none stated     Pain/Comfort:  · Pain Rating 1: 0/10    Objective:     Cognitive Status:    WFL      Receptive Language:   Comprehension:      WFL    Pragmatics:    WFL    Expressive Language:  Verbal:    WFL     Motor Speech:  slightly slurred with speech intelligibility at 90% in unknown context     Voice:   WFL    Visual-Spatial:  WFL      Oral Musculature Evaluation  · Oral Musculature: facial asymmetry present(left facial droop)  · Dentition: present and adequate    Bedside Swallow Eval:   Consistencies Assessed:  · Thin liquids and solids     Oral Phase:   · WFL    Pharyngeal Phase:   · no overt clinical signs/symptoms of aspiration  · no overt clinical signs/symptoms of pharyngeal dysphagia      Assessment:     Tyrone Malhotra is a 38 y.o. male with an SLP  diagnosis of left sided facial droop and Dysarthria.  ST completed eval with pt exhibiting a slight left sided facial droop and slight slurred speech.  There is no evidence of dysphagia, aphasia, or cognitive deficits at this time.     Pt will benefit from ST for stated deficits.     Goals:   Multidisciplinary Problems     SLP Goals        Problem: SLP Goal    Goal Priority Disciplines Outcome   SLP Goal     SLP Ongoing (interventions implemented as appropriate)   Description:  1. Pt will perform oral ex's Independently for increased ROM                    Plan:     · Patient to be seen:  2 x/week   · Plan of Care expires:  07/02/19  · Plan of Care reviewed with:    patient  · SLP Follow-Up:  Yes      Time Tracking:     SLP Treatment Date:   06/28/19  Speech Start Time:  1400  Speech Stop Time:  1425     Speech Total Time (min):  25 min    Billable Minutes: Eval 15  and Eval Swallow and Oral Function 10    Bonita Arce CCC-SLP  06/28/2019

## 2019-06-28 NOTE — PLAN OF CARE
Problem: SLP Goal  Goal: SLP Goal  1. Pt will perform oral ex's Independently for increased ROM  Outcome: Ongoing (interventions implemented as appropriate)  ST completed eval with pt exhibiting a slight left sided facial droop and slight slurred speech.  There is no evidence of dysphagia, aphasia, or cognitive deficits at this time.

## 2019-06-28 NOTE — ASSESSMENT & PLAN NOTE
- Patient's LUE pain and weakness is more consistent with musculoskeletal etiology/epicondylitis.  - CT of the head showed right frontal lobe infarct, most likely old.   - Will obtain MRI of brain, carotid US, and 2D echo.  - Check FLP and HbA1c.  - Continue ASA and statin therapy.

## 2019-06-28 NOTE — PROGRESS NOTES
"[facility] Hospital Medicine  Progress Note    Patient Name: Tyrone Malhotra  MRN: [id number]  Patient Class: IP- Inpatient   Admission Date: 6/27/2019  Length of Stay: 0 days  Attending Physician: Robbie Oswald MD  Primary Care Provider: Primary Doctor No        Subjective:     Principal Problem:Acute right MCA stroke      HPI:  Mr. Malhotra is a 38 y.o. male with a PMHx of uncontrolled HTN, nonischemic dilated CMPY, chronic systolic HFrEF of 25-30%, HLD, DM II, CKD, PHTN, obesity and noncompliance.  He presented to the ED with c/o LUE numbness that he describes as a "shocking" sensation, and LUE weakness  x 2 days.  Patient reports pain when lifting left arm that radiates from elbow to fingertips.  He states he is unable to hold anything in his left hand due to "shocking" pain and weakness.  Wife believes patient suffered a heat stroke while walking to work a few days ago.  Wife reports abnormal behavior by the patient, such as putting his socks in the freezer, running into walls, and garbled speech.  No aggravating or alleviating factors.  Symptoms resolved PTA to ED.  Denies any  HA, visual disturbance, lightheadedness, dizziness, syncope, facial droop, drooling, aphasia, dysphagia, seizures, CP, palpitations, SOB, edema, ABD pain, N/V/D, dysuria, back or neck pain, fever or chills.  Patient hypertensive in ED with /112.  Initial work-up resulted cr. 2.2, BUN 28, normal WBC, CXR unremarkable, EKG unrevealing.  CT of the head showed no acute process, right frontal lobe infarct which is either subacute or old.  Hospital Medicine was called for admission.           Overview/Hospital Course:  Mr Malhotra is a 38 year old male who presented with LUE numbness and weakness X 2 days prior to admission. Symptoms have now improved. CT of the head showed no acute process, right frontal lobe infarct either subacute or old. MRI of the brain showed foci of acute infarction within the right " MCA distribution involving the right frontal lobe. Neurology consulted, patient was evaluated by Dr Styles this AM, who recommend MRA of the head and neck for further evaluation, continue ASA and Statin.     Interval History:  MRI showed foci of acute infarction within the right MCA distribution involving the right frontal lobe. Neurology consulted, recommends noted.     Review of Systems   Constitutional: Negative for chills, diaphoresis, fatigue and fever.   HENT: Negative for congestion, ear discharge, rhinorrhea, sinus pressure, sore throat and trouble swallowing.    Eyes: Negative for discharge and visual disturbance.   Respiratory: Negative for apnea, cough, choking, chest tightness, shortness of breath, wheezing and stridor.    Cardiovascular: Negative for chest pain, palpitations and leg swelling.   Gastrointestinal: Negative for abdominal distention, abdominal pain, diarrhea, nausea and vomiting.   Endocrine: Negative for cold intolerance and heat intolerance.   Genitourinary: Negative for dysuria, frequency and hematuria.   Musculoskeletal: Negative for arthralgias, back pain, myalgias and neck pain.   Skin: Negative for pallor and rash.   Neurological: Positive for speech difficulty, weakness (left arm ) and numbness (left arm ). Negative for dizziness, seizures, syncope and headaches.   Psychiatric/Behavioral: Positive for confusion. Negative for agitation and sleep disturbance.     Objective:     Vital Signs (Most Recent):  Temp: 97.8 °F (36.6 °C) (06/28/19 1142)  Pulse: 82 (06/28/19 1142)  Resp: 18 (06/28/19 1142)  BP: (!) 192/106 (06/28/19 1142)  SpO2: 99 % (06/28/19 1142) Vital Signs (24h Range):  Temp:  [97.7 °F (36.5 °C)-98.4 °F (36.9 °C)] 97.8 °F (36.6 °C)  Pulse:  [75-94] 82  Resp:  [18-21] 18  SpO2:  [96 %-100 %] 99 %  BP: (162-204)/() 192/106     Weight: (!) 156.5 kg (345 lb 0.3 oz)  Body mass index is 50.95 kg/m².  No intake or output data in the 24 hours ending 06/28/19 1428   Physical  Exam   Constitutional: No distress.   HENT:   Mouth/Throat: No oropharyngeal exudate.   Eyes: Right eye exhibits no discharge. Left eye exhibits no discharge.   Neck: No JVD present.   Cardiovascular: Exam reveals no gallop and no friction rub.   No murmur heard.  Pulmonary/Chest: No stridor. No respiratory distress. He has no wheezes. He has no rales. He exhibits no tenderness.   Abdominal: He exhibits no distension and no mass. There is no tenderness. There is no rebound and no guarding. No hernia.   Musculoskeletal:   Mild left upper ext weakness   Neurological: He is alert.   Skin: Skin is warm and dry. He is not diaphoretic.   Psychiatric: He has a normal mood and affect.   Nursing note and vitals reviewed.      Significant Labs:   CBC:   Recent Labs   Lab 06/27/19 2144 06/28/19 0351   WBC 6.57 6.07   HGB 13.2* 12.4*   HCT 38.9* 37.8*    245     CMP:   Recent Labs   Lab 06/27/19 2144 06/28/19 0351   * 138   K 3.8 4.0   CL 98 101   CO2 26 25   * 139*   BUN 28* 27*   CREATININE 2.2* 2.0*   CALCIUM 10.8* 10.2   PROT 7.9  --    ALBUMIN 3.5 3.2*   BILITOT 0.3  --    ALKPHOS 85  --    AST 16  --    ALT 13  --    ANIONGAP 11 12   EGFRNONAA 36* 41*     Cardiac Markers: No results for input(s): CKMB, MYOGLOBIN, BNP, TROPISTAT in the last 48 hours.  Lipid Panel:   Recent Labs   Lab 06/28/19  0428   CHOL 219*   HDL 25*   LDLCALC 160.0*   TRIG 170*   CHOLHDL 11.4*     Magnesium:   Recent Labs   Lab 06/27/19 2144 06/28/19 0351   MG 1.7 1.4*     TSH:   Recent Labs   Lab 06/28/19 0351   TSH 0.080*       Significant Imaging:     Imaging Results          CT Head Without Contrast (Final result)  Result time 06/27/19 22:21:13    Final result by Kenrick Minaya MD (06/27/19 22:21:13)                 Impression:      1. No intracranial hemorrhage or mass.  2. Right frontal lobe infarct which is either subacute or old.  Please see above discussion in the findings section.  All CT scans at this facility are  performed  using dose modulation techniques as appropriate to performed exam including the following:  automated exposure control; adjustment of mA and/or kV according to the patients size (this includes techniques or standardized protocols for targeted exams where dose is matched to indication/reason for exam: i.e. extremities or head);  iterative reconstruction technique.      Electronically signed by: Kenrick Minaya MD  Date:    06/27/2019  Time:    22:21             Narrative:    EXAMINATION:  CT HEAD WITHOUT CONTRAST    CLINICAL HISTORY:  Focal neuro deficit, new, fixed or worsening, >6 hours.  Numbness of left upper extremity for 2 weeks.    TECHNIQUE:  Axial CT imaging was performed through the head without intravenous contrast.    COMPARISON:  None    FINDINGS:  No hydrocephalus, midline shift, mass effect, or acute intracranial hemorrhage. There is a wedge-shaped area of marked decreased density in the right frontal lobe from infarction.  The age of this infarction is either subacute or old.  The frontal horn of the right lateral ventricle is asymmetrically larger suggesting perhaps ex vacuo dilatation which would make this infarct old.  Basilar cisterns and posterior fossa are normal. The visualized paranasal sinuses and mastoid air cells are clear. The skull is intact.                               X-Ray Chest PA And Lateral (Final result)  Result time 06/27/19 22:08:52    Final result by Kenrick Minaya MD (06/27/19 22:08:52)                 Impression:      Negative chest radiograph.      Electronically signed by: Kenrick Minaya MD  Date:    06/27/2019  Time:    22:08             Narrative:    EXAMINATION:  XR CHEST PA AND LATERAL    CLINICAL HISTORY:  Chest Pain;    COMPARISON:  None    FINDINGS:  The lungs are clear. The cardiac silhouette is within normal limits. No pleural effusion or pneumothorax. The bones are intact.                                Assessment/Plan:      * Acute right MCA stroke  -  Admit to Inpatient  -Patient's LUE pain and weakness is more consistent with musculoskeletal etiology/epicondylitis.  - Symptoms have imporved since admission   - CT of the head showed right frontal lobe infarct, most likely old.   - MRI of brain showes Foci of acute infarction within the right MCA distribution involving the right frontal lobe.  -Carotid US showed no significant stenosis.  -2D echo normal left ventricular systolic function (EF 60-65%) and impaired LV relaxation, normal LAP .  -  HbA1c 9.7    - Continue ASA and statin therapy.  -Consult Neurology   -MRA of brain and neck   -PT/OT/Speech     MRA of brain results secure chated with Neurologist, Dr Styles, recommend starting Plavix and review case with Vascular Neurologist.   Reviewed case with Vascular Neurologist, Dr Jackson, recommend DAPT for 3 months, increase atorvastatin 80 mg, patient can follow up with Vascular Neurologist in 1-2 months.   If patient suddenly decompensates will need stat Telestroke evaluation for possible transfer.      to arrange outpatient follow up       Acute encephalopathy  - Resolved PTA, probable related to Acute Stroke on MRI   - Neuro checks.  - , BP in good range for Acute Stroke, will continue current BP meds and monitor   - CT of the head showed chronic infarct.    - Further recs pending clinical course.    Morbid obesity  Weight lost encouraged       Uncontrolled hypertension  - Resume home amlodipine, Coreg, losartan, and Lasix.  Follow BP trends and optimize regimen as needed.    -BP continues to be elevated, monitor for drops in BP in the setting of Acute Stroke     Chronic systolic HFrEF of 25-30%  - Patient underwent CMPY work-up in 12/2018 at [facility].  LHC showed normal coronaries.  TTE showed CLVH with LVEF of 25-30%.  Patient failed to f/u with [facility] for repeat echo.  - Clinically compensated on admission.  - Continue BB, ARB, and Lasix.  - Strict I&O's, daily weights, Na/fluid  restriction.  - BP control.    - Repeat 2D echo shows normal left ventricular systolic function (EF 60-65%) and impaired LV relaxation, normal LAP (grade 1 diastolic dysfunction).   -Continue current medications     CKD (chronic kidney disease) stage 3, GFR 30-59 ml/min  - Initial creatinine 2.2 (baseline 2-2.5).  - Avoid nephrotoxic agents.  - BP control.  - Follow labs.    Type 2 diabetes mellitus, with long-term current use of insulin  - Continue basal insulin.  - AccuChecks with moderate dose SSI.  - Diabetic diet.  - HbA1c 9.7      VTE Risk Mitigation (From admission, onward)        Ordered     heparin (porcine) injection 5,000 Units  Every 8 hours      06/28/19 0102     Place sequential compression device  Until discontinued      06/28/19 0102     IP VTE HIGH RISK PATIENT  Once      06/28/19 0102                Anatoliy Gutierrez NP  Department of Hospital Medicine   [facility]

## 2019-06-28 NOTE — ASSESSMENT & PLAN NOTE
- Resolved PTA, probable related to Acute Stroke on MRI   - Neuro checks.  - , BP in good range for Acute Stroke, will continue current BP meds and monitor   - CT of the head showed chronic infarct.    - Further recs pending clinical course.

## 2019-06-28 NOTE — HPI
"Mr. Malhotra is a 38 y.o. male with a PMHx of uncontrolled HTN, nonischemic dilated CMPY, chronic systolic HFrEF of 25-30%, HLD, DM II, CKD, PHTN, obesity and noncompliance.  He presented to the ED with c/o LUE numbness that he describes as a "shocking" sensation, and LUE weakness x 2 days.  Patient reports pain when lifting left arm that radiates from elbow to fingertips.  He states he is unable to hold anything in his left hand due to "shocking" pain and weakness.  Wife believes patient suffered a heat stroke while walking to work a few days ago.  Wife reports abnormal behavior by the patient, such as putting his socks in the freezer, running into walls, and garbled speech.  No aggravating or alleviating factors.  Symptoms resolved PTA to ED.  Denies any HA, visual disturbance, lightheadedness, dizziness, syncope, facial droop, drooling, aphasia, dysphagia, seizures, CP, palpitations, SOB, edema, ABD pain, N/V/D, dysuria, back or neck pain, fever or chills.  Patient hypertensive in ED with /112.  Initial work-up resulted cr. 2.2, BUN 28, normal WBC, CXR unremarkable, EKG unrevealing.  CT of the head showed no acute process, right frontal lobe infarct which is either subacute or old.  Hospital Medicine was called for admission.         "

## 2019-06-28 NOTE — ED NOTES
"Pts wife reports that 2 day ago pt was walking to work and thinks he had a "heat stroke" She reports that he has been running into walls, having memory loss, misplacing items and she noticed his speech is not as baseline. Patient states that his left arm has been numb and he cannot lift up objects with it. Upon assessment pt has asymmetrical smile, no other deficits noted.   "

## 2019-06-28 NOTE — H&P
"[facility] Hospital Medicine  History & Physical    Patient Name: Tyrone Malhotra  MRN: [id number]  Admission Date: 6/28/2019  Attending Physician: Chris Morales MD   Primary Care Provider: Primary Doctor No         Patient information was obtained from patient, spouse/SO, past medical records and ER records.     Subjective:     Principal Problem:Encephalopathy    Chief Complaint:   Chief Complaint   Patient presents with    Numbness     pt reports numbness to LUE x 2 wks, causing pain when lfting arm.        HPI: Mr. Malhotra is a 38 y.o. male with a PMHx of uncontrolled HTN, nonischemic dilated CMPY, chronic systolic HFrEF of 25-30%, HLD, DM II, CKD, PHTN, obesity and noncompliance.  He presented to the ED with c/o LUE numbness that he describes as a "shocking" sensation, and LUE weakness  x 2 days.  Patient reports pain when lifting left arm that radiates from elbow to fingertips.  He states he is unable to hold anything in his left hand due to "shocking" pain and weakness.  Wife believes patient suffered a heat stroke while walking to work a few days ago.  Wife reports abnormal behavior by the patient, such as putting his socks in the freezer, running into walls, and garbled speech.  No aggravating or alleviating factors.  Symptoms resolved PTA to ED.  Denies any  HA, visual disturbance, lightheadedness, dizziness, syncope, facial droop, drooling, aphasia, dysphagia, seizures, CP, palpitations, SOB, edema, ABD pain, N/V/D, dysuria, back or neck pain, fever or chills.  Patient hypertensive in ED with /112.  Initial work-up resulted cr. 2.2, BUN 28, normal WBC, CXR unremarkable, EKG unrevealing.  CT of the head showed no acute process, right frontal lobe infarct which is either subacute or old.  Hospital Medicine was called for admission.           Past Medical History:   Diagnosis Date    CHF (congestive heart failure)     Chronic systolic heart failure     Decreased renal " function     Diabetes     HTN (hypertension)     Hyperlipidemia     Noncompliance     Obesity     Pulmonary hypertension        Past Surgical History:   Procedure Laterality Date    CARDIAC CATHETERIZATION  12/2018    Normal coronaries    LEFT HEART CATHETERIZATION  2018       Review of patient's allergies indicates:  No Known Allergies    No current facility-administered medications on file prior to encounter.      Current Outpatient Medications on File Prior to Encounter   Medication Sig    amLODIPine (NORVASC) 10 MG tablet Take 10 mg by mouth.    aspirin (ECOTRIN) 81 MG EC tablet Take 81 mg by mouth.    atorvastatin (LIPITOR) 20 MG tablet Take 20 mg by mouth.    carvedilol (COREG) 25 MG tablet Take 25 mg by mouth.    furosemide (LASIX) 40 MG tablet Take 40 mg by mouth.    hydroCHLOROthiazide (MICROZIDE) 12.5 mg capsule Take 12.5 mg by mouth once daily.    insulin aspart U-100 (NOVOLOG FLEXPEN U-100 INSULIN) 100 unit/mL (3 mL) InPn pen Inject 15 Units into the skin.    insulin detemir U-100 (LEVEMIR U-100 INSULIN) 100 unit/mL injection Inject 28 Units into the skin.    losartan (COZAAR) 50 MG tablet Take 50 mg by mouth.     Family History     Problem Relation (Age of Onset)    Asthma Mother    Diabetes Mother, Sister    Down syndrome Daughter    Hypertension Mother, Father, Sister    Stroke Daughter        Tobacco Use    Smoking status: Never Smoker    Smokeless tobacco: Never Used   Substance and Sexual Activity    Alcohol use: Never     Frequency: Never    Drug use: Never    Sexual activity: Not on file     Review of Systems   Constitutional: Negative for activity change, chills, diaphoresis, fatigue, fever and unexpected weight change.   HENT: Negative for congestion, drooling, sore throat and trouble swallowing.    Eyes: Negative for visual disturbance.   Respiratory: Negative for cough, chest tightness, shortness of breath and wheezing.    Cardiovascular: Negative for chest pain,  palpitations and leg swelling.   Gastrointestinal: Negative for abdominal distention, abdominal pain, blood in stool, constipation, diarrhea, nausea and vomiting.   Genitourinary: Negative for decreased urine volume, difficulty urinating, dysuria, frequency, hematuria and urgency.   Musculoskeletal: Negative for arthralgias, back pain, gait problem, myalgias, neck pain and neck stiffness.   Skin: Negative for pallor, rash and wound.   Neurological: Positive for speech difficulty, weakness (LUE) and numbness (LUE). Negative for dizziness, tremors, seizures, syncope, facial asymmetry, light-headedness and headaches.   Psychiatric/Behavioral: Positive for confusion. Negative for sleep disturbance. The patient is not nervous/anxious.    All other systems reviewed and are negative.    Objective:     Vital Signs (Most Recent):  Temp: 98.4 °F (36.9 °C) (06/27/19 2052)  Pulse: 88 (06/28/19 0120)  Resp: 20 (06/28/19 0120)  BP: (!) 204/112 (06/28/19 0120)  SpO2: 100 % (06/28/19 0120) Vital Signs (24h Range):  Temp:  [98.4 °F (36.9 °C)] 98.4 °F (36.9 °C)  Pulse:  [76-94] 88  Resp:  [19-21] 20  SpO2:  [96 %-100 %] 100 %  BP: (164-204)/() 204/112     Weight: (!) 156.5 kg (345 lb 0.3 oz)  Body mass index is 50.95 kg/m².    Physical Exam   Constitutional: He is oriented to person, place, and time. He appears well-developed and well-nourished. No distress.   HENT:   Head: Normocephalic and atraumatic.   Eyes: Conjunctivae are normal.   PERRL; EOM intact.   Neck: Normal range of motion. Neck supple. No JVD present.   Cardiovascular: Normal rate, regular rhythm, S1 normal, S2 normal and intact distal pulses.  No extrasystoles are present. Exam reveals no gallop.   No murmur heard.  Pulses:       Radial pulses are 2+ on the right side, and 2+ on the left side.        Dorsalis pedis pulses are 2+ on the right side, and 2+ on the left side.        Posterior tibial pulses are 2+ on the right side, and 2+ on the left side.    Pulmonary/Chest: Effort normal and breath sounds normal. No accessory muscle usage. No tachypnea. No respiratory distress. He has no wheezes. He has no rhonchi. He has no rales.   Abdominal: Soft. Bowel sounds are normal. He exhibits no distension. There is no tenderness. There is no rebound, no guarding and no CVA tenderness.   Musculoskeletal: Normal range of motion. He exhibits no edema, tenderness or deformity.   Neurological: He is alert and oriented to person, place, and time. He has normal strength. No cranial nerve deficit or sensory deficit. Coordination and gait normal. GCS eye subscore is 4. GCS verbal subscore is 5. GCS motor subscore is 6.   Awake and alert.  Oriented to person, place, time, and situation.  Muscle strength 5/5 to all extremities, but LUE is slightly weaker than RUE.  No motor or sensory deficits.  Speech clear and normal.  No facial droop.  No acute focal neurological deficits appreciated.    Skin: Skin is warm, dry and intact. Capillary refill takes less than 2 seconds. No rash noted. He is not diaphoretic. No cyanosis or erythema.   Psychiatric: He has a normal mood and affect. His speech is normal and behavior is normal. Cognition and memory are normal.   Nursing note and vitals reviewed.          Significant Labs:  Results for orders placed or performed during the hospital encounter of 06/27/19   CBC auto differential   Result Value Ref Range    WBC 6.57 3.90 - 12.70 K/uL    RBC 4.75 4.60 - 6.20 M/uL    Hemoglobin 13.2 (L) 14.0 - 18.0 g/dL    Hematocrit 38.9 (L) 40.0 - 54.0 %    Mean Corpuscular Volume 82 82 - 98 fL    Mean Corpuscular Hemoglobin 27.8 27.0 - 31.0 pg    Mean Corpuscular Hemoglobin Conc 33.9 32.0 - 36.0 g/dL    RDW 13.1 11.5 - 14.5 %    Platelets 299 150 - 350 K/uL    MPV 9.9 9.2 - 12.9 fL    Gran # (ANC) 3.9 1.8 - 7.7 K/uL    Lymph # 2.1 1.0 - 4.8 K/uL    Mono # 0.5 0.3 - 1.0 K/uL    Eos # 0.1 0.0 - 0.5 K/uL    Baso # 0.03 0.00 - 0.20 K/uL    Gran% 59.7 38.0 - 73.0  %    Lymph% 31.8 18.0 - 48.0 %    Mono% 6.8 4.0 - 15.0 %    Eosinophil% 1.5 0.0 - 8.0 %    Basophil% 0.5 0.0 - 1.9 %    Differential Method Automated    Comprehensive metabolic panel   Result Value Ref Range    Sodium 135 (L) 136 - 145 mmol/L    Potassium 3.8 3.5 - 5.1 mmol/L    Chloride 98 95 - 110 mmol/L    CO2 26 23 - 29 mmol/L    Glucose 169 (H) 70 - 110 mg/dL    BUN, Bld 28 (H) 6 - 20 mg/dL    Creatinine 2.2 (H) 0.5 - 1.4 mg/dL    Calcium 10.8 (H) 8.7 - 10.5 mg/dL    Total Protein 7.9 6.0 - 8.4 g/dL    Albumin 3.5 3.5 - 5.2 g/dL    Total Bilirubin 0.3 0.1 - 1.0 mg/dL    Alkaline Phosphatase 85 55 - 135 U/L    AST 16 10 - 40 U/L    ALT 13 10 - 44 U/L    Anion Gap 11 8 - 16 mmol/L    eGFR if African American 42 (A) >60 mL/min/1.73 m^2    eGFR if non African American 36 (A) >60 mL/min/1.73 m^2   Urinalysis, Reflex to Urine Culture Urine, Clean Catch   Result Value Ref Range    Specimen UA Urine, Clean Catch     Color, UA Yellow Yellow, Straw, Myriam    Appearance, UA Clear Clear    pH, UA 6.0 5.0 - 8.0    Specific Gravity, UA 1.010 1.005 - 1.030    Protein, UA 1+ (A) Negative    Glucose, UA Trace (A) Negative    Ketones, UA Negative Negative    Bilirubin (UA) Negative Negative    Occult Blood UA Trace (A) Negative    Nitrite, UA Negative Negative    Urobilinogen, UA Negative <2.0 EU/dL    Leukocytes, UA Negative Negative   Drug screen panel, emergency   Result Value Ref Range    Benzodiazepines Negative     Methadone metabolites Negative     Cocaine (Metab.) Negative     Opiate Scrn, Ur Negative     Barbiturate Screen, Ur Negative     Amphetamine Screen, Ur Negative     THC Negative     Phencyclidine Negative     Creatinine, Random Ur 32.4 23.0 - 375.0 mg/dL    Toxicology Information SEE COMMENT    Ethanol   Result Value Ref Range    Alcohol, Medical, Serum <10 <10 mg/dL   Urinalysis Microscopic   Result Value Ref Range    RBC, UA 1 0 - 4 /hpf    WBC, UA 0 0 - 5 /hpf    Bacteria Rare None-Occ /hpf    Squam Epithel,  UA 1 /hpf    Hyaline Casts, UA 0 0-1/lpf /lpf    Microscopic Comment SEE COMMENT    Magnesium   Result Value Ref Range    Magnesium 1.7 1.6 - 2.6 mg/dL   Phosphorus   Result Value Ref Range    Phosphorus 2.7 2.7 - 4.5 mg/dL      All pertinent labs within the past 24 hours have been reviewed.    Significant Imaging:  Imaging Results          CT Head Without Contrast (Final result)  Result time 06/27/19 22:21:13    Final result by Kenrick Minaya MD (06/27/19 22:21:13)                 Impression:      1. No intracranial hemorrhage or mass.  2. Right frontal lobe infarct which is either subacute or old.  Please see above discussion in the findings section.  All CT scans at this facility are performed  using dose modulation techniques as appropriate to performed exam including the following:  automated exposure control; adjustment of mA and/or kV according to the patients size (this includes techniques or standardized protocols for targeted exams where dose is matched to indication/reason for exam: i.e. extremities or head);  iterative reconstruction technique.      Electronically signed by: Kenrick Minaya MD  Date:    06/27/2019  Time:    22:21             Narrative:    EXAMINATION:  CT HEAD WITHOUT CONTRAST    CLINICAL HISTORY:  Focal neuro deficit, new, fixed or worsening, >6 hours.  Numbness of left upper extremity for 2 weeks.    TECHNIQUE:  Axial CT imaging was performed through the head without intravenous contrast.    COMPARISON:  None    FINDINGS:  No hydrocephalus, midline shift, mass effect, or acute intracranial hemorrhage. There is a wedge-shaped area of marked decreased density in the right frontal lobe from infarction.  The age of this infarction is either subacute or old.  The frontal horn of the right lateral ventricle is asymmetrically larger suggesting perhaps ex vacuo dilatation which would make this infarct old.  Basilar cisterns and posterior fossa are normal. The visualized paranasal sinuses and  mastoid air cells are clear. The skull is intact.                               X-Ray Chest PA And Lateral (Final result)  Result time 06/27/19 22:08:52    Final result by Kenrick Minaya MD (06/27/19 22:08:52)                 Impression:      Negative chest radiograph.      Electronically signed by: Kenrick Minaya MD  Date:    06/27/2019  Time:    22:08             Narrative:    EXAMINATION:  XR CHEST PA AND LATERAL    CLINICAL HISTORY:  Chest Pain;    COMPARISON:  None    FINDINGS:  The lungs are clear. The cardiac silhouette is within normal limits. No pleural effusion or pneumothorax. The bones are intact.                               I have reviewed all pertinent imaging results/findings within the past 24 hours.     EKG: (personally reviewed)  Normal sinus rhythm, possible LAE, LAD, LVH.  No significant change from previous tracings at [facility].              Assessment/Plan:     * Acute encephalopathy  - Resolved PTA.  Possibly hypertensive encephalopathy.  - Neuro checks.  - BP control.  - CT of the head showed chronic infarct.  CVA work-up as below.  - Further recs pending clinical course.    Subacute or chronic stroke (cerebrum)  - Patient's LUE pain and weakness is more consistent with musculoskeletal etiology/epicondylitis.  - CT of the head showed right frontal lobe infarct, most likely old.   - Will obtain MRI of brain, carotid US, and 2D echo.  - Check FLP and HbA1c.  - Continue ASA and statin therapy.    Uncontrolled hypertension  - Resume home amlodipine, Coreg, losartan, and Lasix.  Follow BP trends and optimize regimen as needed.  Would benefit from Bidil, but unsure if financially feasible for patient.  - IV hydralazine PRN.    Chronic systolic HFrEF of 25-30%  - Patient underwent CMPY work-up in 12/2018 at [facility].  LHC showed normal coronaries.  TTE showed [facility] with LVEF of 25-30%.  Patient failed to f/u with [facility] for repeat echo.  - Clinically compensated on admission.  - Continue BB, ARB, and  Lasix.  - Strict I&O's, daily weights, Na/fluid restriction.  - BP control.  - Repeat 2D echo.    CKD (chronic kidney disease) stage 3, GFR 30-59 ml/min  - Initial creatinine 2.2 (baseline 2-2.5).  - Avoid nephrotoxic agents.  - BP control.  - Follow labs.    Type 2 diabetes mellitus, with long-term current use of insulin  - Continue basal insulin.  - AccuChecks with moderate dose SSI.  - Diabetic diet.  - Check HbA1c.      VTE Risk Mitigation (From admission, onward)        Ordered     heparin (porcine) injection 5,000 Units  Every 8 hours      06/28/19 0102     Place sequential compression device  Until discontinued      06/28/19 0102     IP VTE HIGH RISK PATIENT  Once      06/28/19 0102             Shaylee Cedillo NP  Department of Hospital Medicine   [facility]

## 2019-06-28 NOTE — NURSING
Chart review completed. Patient and person at bedside states that he does take his Novolog and Levemir as prescribed. Patient does have a meter and family checks his sugar daily. She stated that his blood sugar is about 200 at times; however, recently the readings have been 150-160. Patient and family state that he does not drink sugary drinks and drinks an adequate amount of water. Dietary restrictions discussed. Hypoglycemia prevention and treatment also discussed.  No further questions or needs at this time.

## 2019-06-28 NOTE — ASSESSMENT & PLAN NOTE
- Resume home amlodipine, Coreg, losartan, and Lasix.  Follow BP trends and optimize regimen as needed.  Would benefit from Bidil, but unsure if financially feasible for patient.  - IV hydralazine PRN.

## 2019-06-28 NOTE — SUBJECTIVE & OBJECTIVE
Past Medical History:   Diagnosis Date    CHF (congestive heart failure)     Chronic systolic heart failure     Decreased renal function     Diabetes     HTN (hypertension)     Hyperlipidemia     Noncompliance     Obesity     Pulmonary hypertension        Past Surgical History:   Procedure Laterality Date    CARDIAC CATHETERIZATION  12/2018    Normal coronaries    LEFT HEART CATHETERIZATION  2018       Review of patient's allergies indicates:  No Known Allergies    No current facility-administered medications on file prior to encounter.      Current Outpatient Medications on File Prior to Encounter   Medication Sig    amLODIPine (NORVASC) 10 MG tablet Take 10 mg by mouth.    aspirin (ECOTRIN) 81 MG EC tablet Take 81 mg by mouth.    atorvastatin (LIPITOR) 20 MG tablet Take 20 mg by mouth.    carvedilol (COREG) 25 MG tablet Take 25 mg by mouth.    furosemide (LASIX) 40 MG tablet Take 40 mg by mouth.    hydroCHLOROthiazide (MICROZIDE) 12.5 mg capsule Take 12.5 mg by mouth once daily.    insulin aspart U-100 (NOVOLOG FLEXPEN U-100 INSULIN) 100 unit/mL (3 mL) InPn pen Inject 15 Units into the skin.    insulin detemir U-100 (LEVEMIR U-100 INSULIN) 100 unit/mL injection Inject 28 Units into the skin.    losartan (COZAAR) 50 MG tablet Take 50 mg by mouth.     Family History     Problem Relation (Age of Onset)    Asthma Mother    Diabetes Mother, Sister    Down syndrome Daughter    Hypertension Mother, Father, Sister    Stroke Daughter        Tobacco Use    Smoking status: Never Smoker    Smokeless tobacco: Never Used   Substance and Sexual Activity    Alcohol use: Never     Frequency: Never    Drug use: Never    Sexual activity: Not on file     Review of Systems   Constitutional: Negative for activity change, chills, diaphoresis, fatigue, fever and unexpected weight change.   HENT: Negative for congestion, drooling, sore throat and trouble swallowing.    Eyes: Negative for visual disturbance.    Respiratory: Negative for cough, chest tightness, shortness of breath and wheezing.    Cardiovascular: Negative for chest pain, palpitations and leg swelling.   Gastrointestinal: Negative for abdominal distention, abdominal pain, blood in stool, constipation, diarrhea, nausea and vomiting.   Genitourinary: Negative for decreased urine volume, difficulty urinating, dysuria, frequency, hematuria and urgency.   Musculoskeletal: Negative for arthralgias, back pain, gait problem, myalgias, neck pain and neck stiffness.   Skin: Negative for pallor, rash and wound.   Neurological: Positive for speech difficulty, weakness (LUE) and numbness (LUE). Negative for dizziness, tremors, seizures, syncope, facial asymmetry, light-headedness and headaches.   Psychiatric/Behavioral: Positive for confusion. Negative for sleep disturbance. The patient is not nervous/anxious.    All other systems reviewed and are negative.    Objective:     Vital Signs (Most Recent):  Temp: 98.4 °F (36.9 °C) (06/27/19 2052)  Pulse: 88 (06/28/19 0120)  Resp: 20 (06/28/19 0120)  BP: (!) 204/112 (06/28/19 0120)  SpO2: 100 % (06/28/19 0120) Vital Signs (24h Range):  Temp:  [98.4 °F (36.9 °C)] 98.4 °F (36.9 °C)  Pulse:  [76-94] 88  Resp:  [19-21] 20  SpO2:  [96 %-100 %] 100 %  BP: (164-204)/() 204/112     Weight: (!) 156.5 kg (345 lb 0.3 oz)  Body mass index is 50.95 kg/m².    Physical Exam   Constitutional: He is oriented to person, place, and time. He appears well-developed and well-nourished. No distress.   HENT:   Head: Normocephalic and atraumatic.   Eyes: Conjunctivae are normal.   PERRL; EOM intact.   Neck: Normal range of motion. Neck supple. No JVD present.   Cardiovascular: Normal rate, regular rhythm, S1 normal, S2 normal and intact distal pulses.  No extrasystoles are present. Exam reveals no gallop.   No murmur heard.  Pulses:       Radial pulses are 2+ on the right side, and 2+ on the left side.        Dorsalis pedis pulses are 2+ on  the right side, and 2+ on the left side.        Posterior tibial pulses are 2+ on the right side, and 2+ on the left side.   Pulmonary/Chest: Effort normal and breath sounds normal. No accessory muscle usage. No tachypnea. No respiratory distress. He has no wheezes. He has no rhonchi. He has no rales.   Abdominal: Soft. Bowel sounds are normal. He exhibits no distension. There is no tenderness. There is no rebound, no guarding and no CVA tenderness.   Musculoskeletal: Normal range of motion. He exhibits no edema, tenderness or deformity.   Neurological: He is alert and oriented to person, place, and time. He has normal strength. No cranial nerve deficit or sensory deficit. Coordination and gait normal. GCS eye subscore is 4. GCS verbal subscore is 5. GCS motor subscore is 6.   Awake and alert.  Oriented to person, place, time, and situation.  Muscle strength 5/5 to all extremities, but LUE is slightly weaker than RUE.  No motor or sensory deficits.  Speech clear and normal.  No facial droop.  No acute focal neurological deficits appreciated.    Skin: Skin is warm, dry and intact. Capillary refill takes less than 2 seconds. No rash noted. He is not diaphoretic. No cyanosis or erythema.   Psychiatric: He has a normal mood and affect. His speech is normal and behavior is normal. Cognition and memory are normal.   Nursing note and vitals reviewed.          Significant Labs:  Results for orders placed or performed during the hospital encounter of 06/27/19   CBC auto differential   Result Value Ref Range    WBC 6.57 3.90 - 12.70 K/uL    RBC 4.75 4.60 - 6.20 M/uL    Hemoglobin 13.2 (L) 14.0 - 18.0 g/dL    Hematocrit 38.9 (L) 40.0 - 54.0 %    Mean Corpuscular Volume 82 82 - 98 fL    Mean Corpuscular Hemoglobin 27.8 27.0 - 31.0 pg    Mean Corpuscular Hemoglobin Conc 33.9 32.0 - 36.0 g/dL    RDW 13.1 11.5 - 14.5 %    Platelets 299 150 - 350 K/uL    MPV 9.9 9.2 - 12.9 fL    Gran # (ANC) 3.9 1.8 - 7.7 K/uL    Lymph # 2.1 1.0 -  4.8 K/uL    Mono # 0.5 0.3 - 1.0 K/uL    Eos # 0.1 0.0 - 0.5 K/uL    Baso # 0.03 0.00 - 0.20 K/uL    Gran% 59.7 38.0 - 73.0 %    Lymph% 31.8 18.0 - 48.0 %    Mono% 6.8 4.0 - 15.0 %    Eosinophil% 1.5 0.0 - 8.0 %    Basophil% 0.5 0.0 - 1.9 %    Differential Method Automated    Comprehensive metabolic panel   Result Value Ref Range    Sodium 135 (L) 136 - 145 mmol/L    Potassium 3.8 3.5 - 5.1 mmol/L    Chloride 98 95 - 110 mmol/L    CO2 26 23 - 29 mmol/L    Glucose 169 (H) 70 - 110 mg/dL    BUN, Bld 28 (H) 6 - 20 mg/dL    Creatinine 2.2 (H) 0.5 - 1.4 mg/dL    Calcium 10.8 (H) 8.7 - 10.5 mg/dL    Total Protein 7.9 6.0 - 8.4 g/dL    Albumin 3.5 3.5 - 5.2 g/dL    Total Bilirubin 0.3 0.1 - 1.0 mg/dL    Alkaline Phosphatase 85 55 - 135 U/L    AST 16 10 - 40 U/L    ALT 13 10 - 44 U/L    Anion Gap 11 8 - 16 mmol/L    eGFR if African American 42 (A) >60 mL/min/1.73 m^2    eGFR if non African American 36 (A) >60 mL/min/1.73 m^2   Urinalysis, Reflex to Urine Culture Urine, Clean Catch   Result Value Ref Range    Specimen UA Urine, Clean Catch     Color, UA Yellow Yellow, Straw, Myriam    Appearance, UA Clear Clear    pH, UA 6.0 5.0 - 8.0    Specific Gravity, UA 1.010 1.005 - 1.030    Protein, UA 1+ (A) Negative    Glucose, UA Trace (A) Negative    Ketones, UA Negative Negative    Bilirubin (UA) Negative Negative    Occult Blood UA Trace (A) Negative    Nitrite, UA Negative Negative    Urobilinogen, UA Negative <2.0 EU/dL    Leukocytes, UA Negative Negative   Drug screen panel, emergency   Result Value Ref Range    Benzodiazepines Negative     Methadone metabolites Negative     Cocaine (Metab.) Negative     Opiate Scrn, Ur Negative     Barbiturate Screen, Ur Negative     Amphetamine Screen, Ur Negative     THC Negative     Phencyclidine Negative     Creatinine, Random Ur 32.4 23.0 - 375.0 mg/dL    Toxicology Information SEE COMMENT    Ethanol   Result Value Ref Range    Alcohol, Medical, Serum <10 <10 mg/dL   Urinalysis  Microscopic   Result Value Ref Range    RBC, UA 1 0 - 4 /hpf    WBC, UA 0 0 - 5 /hpf    Bacteria Rare None-Occ /hpf    Squam Epithel, UA 1 /hpf    Hyaline Casts, UA 0 0-1/lpf /lpf    Microscopic Comment SEE COMMENT    Magnesium   Result Value Ref Range    Magnesium 1.7 1.6 - 2.6 mg/dL   Phosphorus   Result Value Ref Range    Phosphorus 2.7 2.7 - 4.5 mg/dL      All pertinent labs within the past 24 hours have been reviewed.    Significant Imaging:  Imaging Results          CT Head Without Contrast (Final result)  Result time 06/27/19 22:21:13    Final result by Kenrick Minaya MD (06/27/19 22:21:13)                 Impression:      1. No intracranial hemorrhage or mass.  2. Right frontal lobe infarct which is either subacute or old.  Please see above discussion in the findings section.  All CT scans at this facility are performed  using dose modulation techniques as appropriate to performed exam including the following:  automated exposure control; adjustment of mA and/or kV according to the patients size (this includes techniques or standardized protocols for targeted exams where dose is matched to indication/reason for exam: i.e. extremities or head);  iterative reconstruction technique.      Electronically signed by: Kenrick Minaya MD  Date:    06/27/2019  Time:    22:21             Narrative:    EXAMINATION:  CT HEAD WITHOUT CONTRAST    CLINICAL HISTORY:  Focal neuro deficit, new, fixed or worsening, >6 hours.  Numbness of left upper extremity for 2 weeks.    TECHNIQUE:  Axial CT imaging was performed through the head without intravenous contrast.    COMPARISON:  None    FINDINGS:  No hydrocephalus, midline shift, mass effect, or acute intracranial hemorrhage. There is a wedge-shaped area of marked decreased density in the right frontal lobe from infarction.  The age of this infarction is either subacute or old.  The frontal horn of the right lateral ventricle is asymmetrically larger suggesting perhaps ex vacuo  dilatation which would make this infarct old.  Basilar cisterns and posterior fossa are normal. The visualized paranasal sinuses and mastoid air cells are clear. The skull is intact.                               X-Ray Chest PA And Lateral (Final result)  Result time 06/27/19 22:08:52    Final result by Kenrick Minaya MD (06/27/19 22:08:52)                 Impression:      Negative chest radiograph.      Electronically signed by: Kenrick Minaya MD  Date:    06/27/2019  Time:    22:08             Narrative:    EXAMINATION:  XR CHEST PA AND LATERAL    CLINICAL HISTORY:  Chest Pain;    COMPARISON:  None    FINDINGS:  The lungs are clear. The cardiac silhouette is within normal limits. No pleural effusion or pneumothorax. The bones are intact.                               I have reviewed all pertinent imaging results/findings within the past 24 hours.     EKG: (personally reviewed)  Normal sinus rhythm, possible LAE, LAD, LVH.  No significant change from previous tracings at [facility].

## 2019-06-28 NOTE — PLAN OF CARE
Met with patient and significant other/shelby. Patient is independent with adls and iadls.  Patient requests an up-to-date BP cuff/monitor, glucometer, & needle attachments for Insulin post hospital discharge.  Updated white board with 's name and number. Transitional Care Folder, Discharge Planning Begins on Admission pamphlet, [facility] Bedside Delivery pamphlet, Advance Directive information given to patient along with the contact information given.Instructed patient or family to call with any questions or concerns.     D/C Plan:  Home  PCP:  No Primary MD listed  Preferred Pharmacy:  [facility]  Discharge transportation:  Family will provide  My [facility]:  Requests (email link sent)  Pharmacy Bedside Delivery:  Unsure     06/28/19 1324   Discharge Assessment   Assessment Type Discharge Planning Assessment   Confirmed/corrected address and phone number on facesheet? Yes   Assessment information obtained from? Patient;Caregiver;Medical Record  (Kami ibarra) )   Expected Length of Stay (days)   (TBD)   Communicated expected length of stay with patient/caregiver yes   Prior to hospitilization cognitive status: Alert/Oriented   Prior to hospitalization functional status: Independent   Current cognitive status: Alert/Oriented   Current Functional Status: Independent   Facility Arrived From: Home   Lives With significant other  (Kami álvarez))   Able to Return to Prior Arrangements yes   Is patient able to care for self after discharge? Yes   Who are your caregiver(s) and their phone number(s)? Self & Kami ibarra) [phone]   Patient's perception of discharge disposition home or selfcare   Readmission Within the Last 30 Days no previous admission in last 30 days   Patient currently being followed by outpatient case management? No   Patient currently receives any other outside agency services? No   Equipment Currently Used at Home glucometer;other (see comments)  (BP cuff)    Do you have any problems affording any of your prescribed medications? TBD   Is the patient taking medications as prescribed? yes   Does the patient have transportation home? Yes   Transportation Anticipated car, drives self;family or friend will provide   Dialysis Name and Scheduled days N/A   Does the patient receive services at the [facility]? No   Discharge Plan A Home   DME Needed Upon Discharge  glucometer;other (see comments)  (patient requests a new, up-to-date BP cuff & glucometer)   Patient/Family in Agreement with Plan yes

## 2019-06-28 NOTE — CONSULTS
"[facility]  Neurology  Consult Note    Patient Name: Tyrone Malhotra  MRN: [id number]  Admission Date: 6/27/2019  Hospital Length of Stay: 0 days  Code Status: Full Code   Attending Provider: Robbie Oswald MD   Consulting Provider: Maxi Styles MD  Primary Care Physician: Primary Doctor No  Principal Problem:Acute right MCA stroke    Consults  Subjective:     Chief Complaint:  Left upper extremity numbness     HPI:  Mr. Malhotra is a 38 y.o. male with a PMHx of uncontrolled HTN, nonischemic dilated CMPY, chronic systolic HFrEF of 25-30%, HLD, DM II, CKD, PHTN, obesity and noncompliance.  He presented to the ED with c/o LUE numbness that he describes as a "shocking" sensation, and LUE weakness  x 2 days.  Patient reports pain when lifting left arm that radiates from elbow to fingertips.  He states he is unable to hold anything in his left hand due to "shocking" pain and weakness.  Wife believes patient suffered a heat stroke while walking to work a few days ago.  Wife reports abnormal behavior by the patient, such as putting his socks in the freezer, running into walls, and garbled speech.  No aggravating or alleviating factors.  Symptoms resolved PTA to ED.  Denies any  HA, visual disturbance, lightheadedness, dizziness, syncope, facial droop, drooling, aphasia, dysphagia, seizures, CP, palpitations, SOB, edema, ABD pain, N/V/D, dysuria, back or neck pain, fever or chills.  Patient hypertensive in ED with /112.  Initial work-up resulted cr. 2.2, BUN 28, normal WBC, CXR unremarkable, EKG unrevealing.  CT of the head showed no acute process, right frontal lobe infarct which is either subacute or old.  Hospital Medicine was called for admission  One of her daughter has Moyamoya disease.     Past Medical History:   Diagnosis Date    CHF (congestive heart failure)     Chronic systolic heart failure     Decreased renal function     Diabetes     HTN (hypertension)     Hyperlipemia  "    Hyperlipidemia     Noncompliance     Obesity     Pulmonary hypertension        Past Surgical History:   Procedure Laterality Date    CARDIAC CATHETERIZATION  12/2018    Normal coronaries    LEFT HEART CATHETERIZATION  2018       Review of patient's allergies indicates:  No Known Allergies    Current Neurological Medications:     No current facility-administered medications on file prior to encounter.      Current Outpatient Medications on File Prior to Encounter   Medication Sig    amLODIPine (NORVASC) 10 MG tablet Take 10 mg by mouth.    aspirin (ECOTRIN) 81 MG EC tablet Take 81 mg by mouth.    atorvastatin (LIPITOR) 20 MG tablet Take 20 mg by mouth.    carvedilol (COREG) 25 MG tablet Take 25 mg by mouth.    furosemide (LASIX) 40 MG tablet Take 40 mg by mouth.    hydroCHLOROthiazide (MICROZIDE) 12.5 mg capsule Take 12.5 mg by mouth once daily.    insulin aspart U-100 (NOVOLOG FLEXPEN U-100 INSULIN) 100 unit/mL (3 mL) InPn pen Inject 15 Units into the skin.    insulin detemir U-100 (LEVEMIR U-100 INSULIN) 100 unit/mL injection Inject 28 Units into the skin.    losartan (COZAAR) 50 MG tablet Take 50 mg by mouth.      Family History     Problem Relation (Age of Onset)    Asthma Mother    Diabetes Mother, Sister    Down syndrome Daughter    Hypertension Mother, Father, Sister    Stroke Daughter        Tobacco Use    Smoking status: Never Smoker    Smokeless tobacco: Never Used   Substance and Sexual Activity    Alcohol use: Never     Frequency: Never    Drug use: Never    Sexual activity: Not on file     Review of Systems   Constitutional: Negative.  Negative for activity change, appetite change, diaphoresis, fatigue, fever and unexpected weight change.   HENT: Negative for drooling, ear pain, facial swelling, hearing loss, mouth sores, sinus pressure, sneezing, sore throat, tinnitus, trouble swallowing and voice change.    Eyes: Negative.  Negative for photophobia, pain, discharge, redness,  itching and visual disturbance.   Respiratory: Negative.  Negative for cough, chest tightness, shortness of breath and wheezing.    Cardiovascular: Negative.  Negative for chest pain, palpitations and leg swelling.   Gastrointestinal: Negative.  Negative for abdominal pain, anal bleeding, diarrhea, nausea and vomiting.   Endocrine: Negative.  Negative for cold intolerance, heat intolerance, polydipsia, polyphagia and polyuria.   Genitourinary: Negative.  Negative for difficulty urinating, dysuria, flank pain, frequency, testicular pain and urgency.   Musculoskeletal: Negative.  Negative for arthralgias, back pain, gait problem, joint swelling, myalgias, neck pain and neck stiffness.   Skin: Negative for color change, pallor, rash and wound.   Allergic/Immunologic: Negative for environmental allergies, food allergies and immunocompromised state.   Neurological: Positive for speech difficulty, weakness and numbness. Negative for dizziness, tremors, seizures, syncope, facial asymmetry, light-headedness and headaches.   Hematological: Negative.  Does not bruise/bleed easily.   Psychiatric/Behavioral: Negative.  Negative for agitation, behavioral problems, confusion, decreased concentration, dysphoric mood, hallucinations, self-injury, sleep disturbance and suicidal ideas. The patient is not nervous/anxious and is not hyperactive.      Objective:     Vital Signs (Most Recent):  Temp: 97.8 °F (36.6 °C) (06/28/19 1142)  Pulse: 82 (06/28/19 1142)  Resp: 18 (06/28/19 1142)  BP: (Abnormal) 192/106 (06/28/19 1142)  SpO2: 99 % (06/28/19 1142) Vital Signs (24h Range):  Temp:  [97.7 °F (36.5 °C)-98.4 °F (36.9 °C)] 97.8 °F (36.6 °C)  Pulse:  [75-94] 82  Resp:  [18-21] 18  SpO2:  [96 %-100 %] 99 %  BP: (162-204)/() 192/106     Weight: (Abnormal) 156.5 kg (345 lb 0.3 oz)  Body mass index is 50.95 kg/m².    Physical Exam   Constitutional: He is oriented to person, place, and time. He appears well-developed and well-nourished.  No distress.   HENT:   Head: Normocephalic.   Right Ear: External ear normal.   Left Ear: External ear normal.   Mouth/Throat: Oropharynx is clear and moist.   Eyes: Pupils are equal, round, and reactive to light. Conjunctivae and EOM are normal.   Neck: Normal range of motion. Neck supple. No tracheal deviation present. No thyromegaly present.   Cardiovascular: Regular rhythm, normal heart sounds and intact distal pulses.   Pulmonary/Chest: Effort normal and breath sounds normal. No respiratory distress.   Abdominal: Soft. Bowel sounds are normal. There is no tenderness.   Musculoskeletal: He exhibits no edema or tenderness.   Lymphadenopathy:     He has no cervical adenopathy.   Neurological: He is alert and oriented to person, place, and time. He has normal strength and normal reflexes. He displays no tremor and normal reflexes. No cranial nerve deficit or sensory deficit. He exhibits normal muscle tone. He has a normal Finger-Nose-Finger Test, a normal Heel to Shin Test, a normal Romberg Test and a normal Tandem Gait Test. Gait normal. Coordination normal. He displays no Babinski's sign on the right side. He displays no Babinski's sign on the left side.   Reflex Scores:       Tricep reflexes are 2+ on the right side and 2+ on the left side.       Bicep reflexes are 2+ on the right side and 2+ on the left side.       Brachioradialis reflexes are 2+ on the right side and 2+ on the left side.       Patellar reflexes are 2+ on the right side and 2+ on the left side.       Achilles reflexes are 2+ on the right side and 2+ on the left side.  Skin: Skin is warm and dry. No rash noted. He is not diaphoretic. No erythema. No pallor.   Psychiatric: He has a normal mood and affect. His behavior is normal. Judgment and thought content normal.       NEUROLOGICAL EXAMINATION:     MENTAL STATUS   Oriented to person, place, and time.     CRANIAL NERVES     CN II   Visual fields full to confrontation.     CN III, IV, VI   Pupils  are equal, round, and reactive to light.  Extraocular motions are normal.     CN V   Facial sensation intact.     CN VII   Facial expression full, symmetric.     CN VIII   CN VIII normal.     CN IX, X   CN IX normal.     CN XI   CN XI normal.     CN XII   CN XII normal.     MOTOR EXAM   Muscle bulk: normal  Overall muscle tone: normal  Right arm tone: normal  Left arm tone: normal  Right arm pronator drift: absent  Left arm pronator drift: absent  Right leg tone: normal  Left leg tone: normal    Strength   Strength 5/5 throughout.   Right neck flexion: 5/5  Left neck flexion: 5/5  Right neck extension: 5/5  Left neck extension: 5/5  Right deltoid: 5/5  Left deltoid: 5/5  Right biceps: 5/5  Left biceps: 5/5  Right triceps: 5/5  Left triceps: 5/5  Right wrist flexion: 5/5  Left wrist flexion: 5/5  Right wrist extension: 5/5  Left wrist extension: 5/5  Right interossei: 5/5  Left interossei: 5/5  Right abdominals: 5/5  Left abdominals: 5/5  Right iliopsoas: 5/5  Left iliopsoas: 5/5  Right quadriceps: 5/5  Left quadriceps: 5/5  Right hamstrin/5  Left hamstrin/5  Right glutei: 5/5  Left glutei: 5/5  Right anterior tibial: 5/5  Left anterior tibial: 5/5  Right posterior tibial: 5/5  Left posterior tibial: 5/5  Right peroneal: 5/5  Left peroneal: 5/5  Right gastroc: 5/5  Left gastroc: 5/5    REFLEXES     Reflexes   Right brachioradialis: 2+  Left brachioradialis: 2+  Right biceps: 2+  Left biceps: 2+  Right triceps: 2+  Left triceps: 2+  Right patellar: 2+  Left patellar: 2+  Right achilles: 2+  Left achilles: 2+  Right plantar: normal  Left plantar: normal    SENSORY EXAM   Light touch normal.   Vibration normal.   Proprioception normal.   Pinprick normal.   Graphesthesia: normal  Romberg: negative  Stereognosis: normal    GAIT AND COORDINATION     Gait  Gait: normal     Coordination   Finger to nose coordination: normal  Heel to shin coordination: normal  Tandem walking coordination: normal    Tremor   Resting  tremor: absent  Intention tremor: absent  Action tremor: absent      Significant Labs:   Hemoglobin A1c:   Recent Labs   Lab 06/28/19  0428   HGBA1C 9.7*     CBC:   Recent Labs   Lab 06/27/19 2144 06/28/19  0351   WBC 6.57 6.07   HGB 13.2* 12.4*   HCT 38.9* 37.8*    245     CMP:   Recent Labs   Lab 06/27/19 2144 06/28/19  0351   * 139*   * 138   K 3.8 4.0   CL 98 101   CO2 26 25   BUN 28* 27*   CREATININE 2.2* 2.0*   CALCIUM 10.8* 10.2   MG 1.7 1.4*   PROT 7.9  --    ALBUMIN 3.5 3.2*   BILITOT 0.3  --    ALKPHOS 85  --    AST 16  --    ALT 13  --    ANIONGAP 11 12   EGFRNONAA 36* 41*       Significant Imaging:     MRI of the brain:6/28/2019  Impression       1. Foci of acute infarction within the right MCA distribution involving the right frontal lobe.  2. Older foci of infarct also in this distribution including a cortical insult of the right parietal lobe and hemorrhagic lacunar infarct within the right basal ganglia.  3. Somewhat irregular flow void involving the right middle cerebral artery.  There may be stenosis in this region.  CTA of the head and neck would provide a more thorough evaluation.     CT head: 6/27/2019    Impression       1. No intracranial hemorrhage or mass.  2. Right frontal lobe infarct which is either subacute or old.  Please see above discussion in the findings section.     Carotid ultrasound: No significant stenosis.    Assessment and Plan:     1. Acute stroke: hypertensive stroke he has many risk factors.   STROKE RISK FACTORS:     Renal Failure CKD   Hypertension Yes   Diabetes yes   Coronary Artery Disease Yes   Other Cardiac Disease yes   Hyperlipidemia Yes   Insulin Resistance No   Previous stroke/TIA Yes   White Matter disease No   Smoking no   Sleep Apnea No   Family History Yes   Migraine headaches Yes   Hormonal Supplement No   Obesity yes   PVD No   Atrial Fibrillation No   Hypercoagulability     No           Active Diagnoses:    Diagnosis Date Noted POA     PRINCIPAL PROBLEM:  Acute right MCA stroke [I63.511] 06/28/2019 Yes    CKD (chronic kidney disease) stage 3, GFR 30-59 ml/min [N18.3] 06/28/2019 Yes     Chronic    Acute encephalopathy [G93.40] 06/28/2019 Yes    Uncontrolled hypertension [I10] 12/20/2018 Yes     Chronic    Chronic systolic HFrEF of 25-30% [I50.22] 12/12/2018 Yes     Chronic    Type 2 diabetes mellitus, with long-term current use of insulin [E11.9, Z79.4] 12/24/2015 Not Applicable     Chronic    Morbid obesity [E66.01] 12/24/2015 Yes      Problems Resolved During this Admission:       VTE Risk Mitigation (From admission, onward)        Ordered     heparin (porcine) injection 5,000 Units  Every 8 hours      06/28/19 0102     Place sequential compression device  Until discontinued      06/28/19 0102     IP VTE HIGH RISK PATIENT  Once      06/28/19 0102        Recommendation:   1. ASA , statin and BP control.  2. Rest for 1 month.  3. Weight reduction a must.  4.will DO MRA of brain and Neck.    Thank you for your consult. I will follow-up with patient. Please contact us if you have any additional questions.    Maxi Styles MD  Neurology  [facility]

## 2019-06-28 NOTE — SUBJECTIVE & OBJECTIVE
Interval History:  MRI showed foci of acute infarction within the right MCA distribution involving the right frontal lobe. Neurology consulted, recommends noted.     Review of Systems   Constitutional: Negative for chills, diaphoresis, fatigue and fever.   HENT: Negative for congestion, ear discharge, rhinorrhea, sinus pressure, sore throat and trouble swallowing.    Eyes: Negative for discharge and visual disturbance.   Respiratory: Negative for apnea, cough, choking, chest tightness, shortness of breath, wheezing and stridor.    Cardiovascular: Negative for chest pain, palpitations and leg swelling.   Gastrointestinal: Negative for abdominal distention, abdominal pain, diarrhea, nausea and vomiting.   Endocrine: Negative for cold intolerance and heat intolerance.   Genitourinary: Negative for dysuria, frequency and hematuria.   Musculoskeletal: Negative for arthralgias, back pain, myalgias and neck pain.   Skin: Negative for pallor and rash.   Neurological: Positive for speech difficulty, weakness (left arm ) and numbness (left arm ). Negative for dizziness, seizures, syncope and headaches.   Psychiatric/Behavioral: Positive for confusion. Negative for agitation and sleep disturbance.     Objective:     Vital Signs (Most Recent):  Temp: 97.8 °F (36.6 °C) (06/28/19 1142)  Pulse: 82 (06/28/19 1142)  Resp: 18 (06/28/19 1142)  BP: (!) 192/106 (06/28/19 1142)  SpO2: 99 % (06/28/19 1142) Vital Signs (24h Range):  Temp:  [97.7 °F (36.5 °C)-98.4 °F (36.9 °C)] 97.8 °F (36.6 °C)  Pulse:  [75-94] 82  Resp:  [18-21] 18  SpO2:  [96 %-100 %] 99 %  BP: (162-204)/() 192/106     Weight: (!) 156.5 kg (345 lb 0.3 oz)  Body mass index is 50.95 kg/m².  No intake or output data in the 24 hours ending 06/28/19 1428   Physical Exam   Constitutional: No distress.   HENT:   Mouth/Throat: No oropharyngeal exudate.   Eyes: Right eye exhibits no discharge. Left eye exhibits no discharge.   Neck: No JVD present.   Cardiovascular: Exam  reveals no gallop and no friction rub.   No murmur heard.  Pulmonary/Chest: No stridor. No respiratory distress. He has no wheezes. He has no rales. He exhibits no tenderness.   Abdominal: He exhibits no distension and no mass. There is no tenderness. There is no rebound and no guarding. No hernia.   Musculoskeletal:   Mild left upper ext weakness   Neurological: He is alert.   Skin: Skin is warm and dry. He is not diaphoretic.   Psychiatric: He has a normal mood and affect.   Nursing note and vitals reviewed.      Significant Labs:   CBC:   Recent Labs   Lab 06/27/19 2144 06/28/19 0351   WBC 6.57 6.07   HGB 13.2* 12.4*   HCT 38.9* 37.8*    245     CMP:   Recent Labs   Lab 06/27/19 2144 06/28/19 0351   * 138   K 3.8 4.0   CL 98 101   CO2 26 25   * 139*   BUN 28* 27*   CREATININE 2.2* 2.0*   CALCIUM 10.8* 10.2   PROT 7.9  --    ALBUMIN 3.5 3.2*   BILITOT 0.3  --    ALKPHOS 85  --    AST 16  --    ALT 13  --    ANIONGAP 11 12   EGFRNONAA 36* 41*     Cardiac Markers: No results for input(s): CKMB, MYOGLOBIN, BNP, TROPISTAT in the last 48 hours.  Lipid Panel:   Recent Labs   Lab 06/28/19  0428   CHOL 219*   HDL 25*   LDLCALC 160.0*   TRIG 170*   CHOLHDL 11.4*     Magnesium:   Recent Labs   Lab 06/27/19 2144 06/28/19 0351   MG 1.7 1.4*     TSH:   Recent Labs   Lab 06/28/19 0351   TSH 0.080*       Significant Imaging:     Imaging Results          CT Head Without Contrast (Final result)  Result time 06/27/19 22:21:13    Final result by Kenrick Minaya MD (06/27/19 22:21:13)                 Impression:      1. No intracranial hemorrhage or mass.  2. Right frontal lobe infarct which is either subacute or old.  Please see above discussion in the findings section.  All CT scans at this facility are performed  using dose modulation techniques as appropriate to performed exam including the following:  automated exposure control; adjustment of mA and/or kV according to the patients size (this includes  techniques or standardized protocols for targeted exams where dose is matched to indication/reason for exam: i.e. extremities or head);  iterative reconstruction technique.      Electronically signed by: Kenrick Minaya MD  Date:    06/27/2019  Time:    22:21             Narrative:    EXAMINATION:  CT HEAD WITHOUT CONTRAST    CLINICAL HISTORY:  Focal neuro deficit, new, fixed or worsening, >6 hours.  Numbness of left upper extremity for 2 weeks.    TECHNIQUE:  Axial CT imaging was performed through the head without intravenous contrast.    COMPARISON:  None    FINDINGS:  No hydrocephalus, midline shift, mass effect, or acute intracranial hemorrhage. There is a wedge-shaped area of marked decreased density in the right frontal lobe from infarction.  The age of this infarction is either subacute or old.  The frontal horn of the right lateral ventricle is asymmetrically larger suggesting perhaps ex vacuo dilatation which would make this infarct old.  Basilar cisterns and posterior fossa are normal. The visualized paranasal sinuses and mastoid air cells are clear. The skull is intact.                               X-Ray Chest PA And Lateral (Final result)  Result time 06/27/19 22:08:52    Final result by Kenrick Minaya MD (06/27/19 22:08:52)                 Impression:      Negative chest radiograph.      Electronically signed by: Kenrick Minaya MD  Date:    06/27/2019  Time:    22:08             Narrative:    EXAMINATION:  XR CHEST PA AND LATERAL    CLINICAL HISTORY:  Chest Pain;    COMPARISON:  None    FINDINGS:  The lungs are clear. The cardiac silhouette is within normal limits. No pleural effusion or pneumothorax. The bones are intact.

## 2019-06-28 NOTE — ASSESSMENT & PLAN NOTE
- Resume home amlodipine, Coreg, losartan, and Lasix.  Follow BP trends and optimize regimen as needed.    -BP continues to be elevated, monitor for drops in BP in the setting of Acute Stroke

## 2019-06-28 NOTE — HOSPITAL COURSE
Mr Malhotra is a 38 year old male who presented with LUE numbness and weakness X 2 days prior to admission. Symptoms have now improved. CT of the head showed no acute process, right frontal lobe infarct either subacute or old. MRI of the brain showed foci of acute infarction within the right MCA distribution involving the right frontal lobe. Neurology consulted, patient was evaluated by Dr Styles this AM, who recommend MRA of the head and neck for further evaluation, continue ASA and Statin. Patient MRA consistent with acute CVA and demonstrating Right MCA near complete occlusion. Patient to follow up with Vascular Neurology and his PCP. Patient evaluated by PT/OT/Speech. All recommending o/p followup. Patient counseled on weight loss and medical compliance. Patient provided prescriptions for all of his medications and again counseled on the need for compliance. Patient seen and examined today prior to his discharge to home.

## 2019-06-29 VITALS
SYSTOLIC BLOOD PRESSURE: 129 MMHG | TEMPERATURE: 98 F | OXYGEN SATURATION: 98 % | BODY MASS INDEX: 46.65 KG/M2 | WEIGHT: 315 LBS | HEIGHT: 69 IN | DIASTOLIC BLOOD PRESSURE: 83 MMHG | HEART RATE: 85 BPM | RESPIRATION RATE: 18 BRPM

## 2019-06-29 PROBLEM — G93.40 ENCEPHALOPATHY: Status: RESOLVED | Noted: 2019-06-28 | Resolved: 2019-06-29

## 2019-06-29 LAB
ALBUMIN SERPL BCP-MCNC: 3.1 G/DL (ref 3.5–5.2)
ANION GAP SERPL CALC-SCNC: 11 MMOL/L (ref 8–16)
BASOPHILS # BLD AUTO: 0.02 K/UL (ref 0–0.2)
BASOPHILS NFR BLD: 0.4 % (ref 0–1.9)
BUN SERPL-MCNC: 31 MG/DL (ref 6–20)
CALCIUM SERPL-MCNC: 10.2 MG/DL (ref 8.7–10.5)
CHLORIDE SERPL-SCNC: 98 MMOL/L (ref 95–110)
CO2 SERPL-SCNC: 23 MMOL/L (ref 23–29)
CREAT SERPL-MCNC: 2.1 MG/DL (ref 0.5–1.4)
DIFFERENTIAL METHOD: ABNORMAL
EOSINOPHIL # BLD AUTO: 0.1 K/UL (ref 0–0.5)
EOSINOPHIL NFR BLD: 1.4 % (ref 0–8)
ERYTHROCYTE [DISTWIDTH] IN BLOOD BY AUTOMATED COUNT: 13.3 % (ref 11.5–14.5)
EST. GFR  (AFRICAN AMERICAN): 44 ML/MIN/1.73 M^2
EST. GFR  (NON AFRICAN AMERICAN): 38 ML/MIN/1.73 M^2
GLUCOSE SERPL-MCNC: 283 MG/DL (ref 70–110)
HCT VFR BLD AUTO: 37.9 % (ref 40–54)
HGB BLD-MCNC: 12.4 G/DL (ref 14–18)
LYMPHOCYTES # BLD AUTO: 2.1 K/UL (ref 1–4.8)
LYMPHOCYTES NFR BLD: 37.1 % (ref 18–48)
MAGNESIUM SERPL-MCNC: 1.8 MG/DL (ref 1.6–2.6)
MCH RBC QN AUTO: 27.4 PG (ref 27–31)
MCHC RBC AUTO-ENTMCNC: 32.7 G/DL (ref 32–36)
MCV RBC AUTO: 84 FL (ref 82–98)
MONOCYTES # BLD AUTO: 0.5 K/UL (ref 0.3–1)
MONOCYTES NFR BLD: 8 % (ref 4–15)
NEUTROPHILS # BLD AUTO: 3 K/UL (ref 1.8–7.7)
NEUTROPHILS NFR BLD: 53.1 % (ref 38–73)
PHOSPHATE SERPL-MCNC: 3.1 MG/DL (ref 2.7–4.5)
PLATELET # BLD AUTO: 260 K/UL (ref 150–350)
PMV BLD AUTO: 10.7 FL (ref 9.2–12.9)
POCT GLUCOSE: 355 MG/DL (ref 70–110)
POTASSIUM SERPL-SCNC: 4.2 MMOL/L (ref 3.5–5.1)
RBC # BLD AUTO: 4.53 M/UL (ref 4.6–6.2)
SODIUM SERPL-SCNC: 132 MMOL/L (ref 136–145)
WBC # BLD AUTO: 5.61 K/UL (ref 3.9–12.7)

## 2019-06-29 PROCEDURE — 25000003 PHARM REV CODE 250: Performed by: NURSE PRACTITIONER

## 2019-06-29 PROCEDURE — 85025 COMPLETE CBC W/AUTO DIFF WBC: CPT

## 2019-06-29 PROCEDURE — 80069 RENAL FUNCTION PANEL: CPT

## 2019-06-29 PROCEDURE — 97165 OT EVAL LOW COMPLEX 30 MIN: CPT

## 2019-06-29 PROCEDURE — 25000003 PHARM REV CODE 250: Performed by: INTERNAL MEDICINE

## 2019-06-29 PROCEDURE — 25000003 PHARM REV CODE 250: Performed by: HOSPITALIST

## 2019-06-29 PROCEDURE — 83735 ASSAY OF MAGNESIUM: CPT

## 2019-06-29 PROCEDURE — 97530 THERAPEUTIC ACTIVITIES: CPT

## 2019-06-29 PROCEDURE — 97116 GAIT TRAINING THERAPY: CPT

## 2019-06-29 PROCEDURE — 36415 COLL VENOUS BLD VENIPUNCTURE: CPT

## 2019-06-29 RX ORDER — FUROSEMIDE 40 MG/1
40 TABLET ORAL DAILY
Qty: 30 TABLET | Refills: 3 | Status: SHIPPED | OUTPATIENT
Start: 2019-06-29

## 2019-06-29 RX ORDER — LOSARTAN POTASSIUM 50 MG/1
50 TABLET ORAL DAILY
Qty: 30 TABLET | Refills: 3 | Status: SHIPPED | OUTPATIENT
Start: 2019-06-29 | End: 2020-06-28

## 2019-06-29 RX ORDER — CLONIDINE HYDROCHLORIDE 0.1 MG/1
0.1 TABLET ORAL 2 TIMES DAILY
Qty: 60 TABLET | Refills: 11 | Status: SHIPPED | OUTPATIENT
Start: 2019-06-29 | End: 2020-06-28

## 2019-06-29 RX ORDER — CLONIDINE HYDROCHLORIDE 0.1 MG/1
0.1 TABLET ORAL 2 TIMES DAILY
Status: DISCONTINUED | OUTPATIENT
Start: 2019-06-29 | End: 2019-06-29 | Stop reason: HOSPADM

## 2019-06-29 RX ORDER — CARVEDILOL 25 MG/1
25 TABLET ORAL 2 TIMES DAILY
Qty: 60 TABLET | Refills: 3 | Status: SHIPPED | OUTPATIENT
Start: 2019-06-29 | End: 2020-06-28

## 2019-06-29 RX ORDER — INSULIN ASPART 100 [IU]/ML
15 INJECTION, SOLUTION INTRAVENOUS; SUBCUTANEOUS
Qty: 3 BOX | Refills: 3 | Status: SHIPPED | OUTPATIENT
Start: 2019-06-29 | End: 2019-12-26

## 2019-06-29 RX ORDER — ATORVASTATIN CALCIUM 20 MG/1
20 TABLET, FILM COATED ORAL DAILY
Qty: 90 TABLET | Refills: 1 | Status: SHIPPED | OUTPATIENT
Start: 2019-06-29 | End: 2019-12-26

## 2019-06-29 RX ORDER — CLOPIDOGREL BISULFATE 75 MG/1
75 TABLET ORAL DAILY
Qty: 30 TABLET | Refills: 11 | Status: SHIPPED | OUTPATIENT
Start: 2019-06-30 | End: 2020-06-29

## 2019-06-29 RX ORDER — PEN NEEDLE, DIABETIC 30 GX3/16"
15 NEEDLE, DISPOSABLE MISCELLANEOUS NIGHTLY
Qty: 100 EACH | Refills: 3 | Status: SHIPPED | OUTPATIENT
Start: 2019-06-29

## 2019-06-29 RX ORDER — AMLODIPINE BESYLATE 10 MG/1
10 TABLET ORAL DAILY
Qty: 30 TABLET | Refills: 3 | Status: SHIPPED | OUTPATIENT
Start: 2019-06-29 | End: 2020-06-28

## 2019-06-29 RX ADMIN — ASPIRIN 81 MG: 81 TABLET, COATED ORAL at 08:06

## 2019-06-29 RX ADMIN — INSULIN DETEMIR 15 UNITS: 100 INJECTION, SOLUTION SUBCUTANEOUS at 08:06

## 2019-06-29 RX ADMIN — INSULIN ASPART 10 UNITS: 100 INJECTION, SOLUTION INTRAVENOUS; SUBCUTANEOUS at 11:06

## 2019-06-29 RX ADMIN — CARVEDILOL 25 MG: 12.5 TABLET, FILM COATED ORAL at 08:06

## 2019-06-29 RX ADMIN — CLONIDINE HYDROCHLORIDE 0.1 MG: 0.1 TABLET ORAL at 10:06

## 2019-06-29 RX ADMIN — ATORVASTATIN CALCIUM 80 MG: 40 TABLET, FILM COATED ORAL at 08:06

## 2019-06-29 RX ADMIN — LOSARTAN POTASSIUM 50 MG: 50 TABLET, FILM COATED ORAL at 08:06

## 2019-06-29 RX ADMIN — AMLODIPINE BESYLATE 10 MG: 10 TABLET ORAL at 08:06

## 2019-06-29 RX ADMIN — ACETAMINOPHEN 650 MG: 325 TABLET ORAL at 11:06

## 2019-06-29 RX ADMIN — FUROSEMIDE 40 MG: 40 TABLET ORAL at 08:06

## 2019-06-29 RX ADMIN — CLOPIDOGREL BISULFATE 75 MG: 75 TABLET ORAL at 08:06

## 2019-06-29 NOTE — PT/OT/SLP PROGRESS
Physical Therapy  Treatment    Tyrone Malhotra   MRN: [id number]   Admitting Diagnosis: Acute right MCA stroke    PT Received On: 06/29/19  PT Start Time: 0754     PT Stop Time: 0809    PT Total Time (min): 15 min       Billable Minutes:  Gait Training 15 minutes    Treatment Type: Treatment  PT/PTA: PTA     PTA Visit Number: 1       General Precautions: Standard, fall  Orthopedic Precautions: N/A   Braces:      Spiritual, Cultural Beliefs, Restoration Practices, Values that Affect Care: yes    Subjective:  Communicated with epic and nurse Mas prior to session.      Pain/Comfort  Pain Rating 1: 0/10    Objective:   Patient found with: peripheral IV    Functional Mobility:  Bed Mobility: mod I       Transfers:mod I       Gait: SBA       Stairs:n/a this session          Balance:   Static Sit: GOOD+: Takes MAXIMAL challenges from all directions.    Dynamic Sit: GOOD: Maintains balance through MODERATE excursions of active trunk movement  Static Stand: FAIR+: Takes MINIMAL challenges from all directions  Dynamic stand: FAIR: Needs CONTACT GUARD during gait     Therapeutic Activities and Exercises:  Gait training received with SBA. Ambulated ~ 400 feet with good carmella and step length. Slight unsteadiness when pivoting/changing direction. No remarkable balance disturbances observed.     AM-PAC 6 CLICK MOBILITY  How much help from another person does this patient currently need?   1 = Unable, Total/Dependent Assistance  2 = A lot, Maximum/Moderate Assistance  3 = A little, Minimum/Contact Guard/Supervision  4 = None, Modified [place]/Independent    Turning over in bed (including adjusting bedclothes, sheets and blankets)?: 4  Sitting down on and standing up from a chair with arms (e.g., wheelchair, bedside commode, etc.): 4  Moving from lying on back to sitting on the side of the bed?: 4  Moving to and from a bed to a chair (including a wheelchair)?: 4  Need to walk in hospital room?: 3  Climbing 3-5 steps  with a railing?: 3  Basic Mobility Total Score: 22    AM-PAC Raw Score CMS G-Code Modifier Level of Impairment Assistance   6 % Total / Unable   7 - 9 CM 80 - 100% Maximal Assist   10 - 14 CL 60 - 80% Moderate Assist   15 - 19 CK 40 - 60% Moderate Assist   20 - 22 CJ 20 - 40% Minimal Assist   23 CI 1-20% SBA / CGA   24 CH 0% Independent/ Mod I     Patient left sitting EOB with all lines intact, call button in reach and nursing notified.    Assessment:  Tyrone Malhotra is a 38 y.o. male with a medical diagnosis of Acute right MCA stroke.    Rehab identified problem list/impairments: Rehab identified problem list/impairments: gait instability, impaired endurance, weakness    Rehab potential is good.    Activity tolerance: Excellent    Discharge recommendations: Discharge Facility/Level of Care Needs: home, outpatient PT     Barriers to discharge:      Equipment recommendations:       GOALS:   Multidisciplinary Problems     Physical Therapy Goals        Problem: Physical Therapy Goal    Goal Priority Disciplines Outcome Goal Variances Interventions   Physical Therapy Goal     PT, PT/OT      Description:  PT WILL BE SEEN FOR P.T. FOR A MIN OF 5 OUT OF 7 DAYS A WEEK  LT19  1. PT WILL COMPLETE BED MOBILITY IND  2. PT WILL GT TRAIN X 500' WITH NO AD IND.   3. PT WILL COMPLETE T/F TRAINING IND  4. PT WILL ASCEND AND DESCEND X 1 FLIGHT OF STEPS MOD  I WITH B RAILING.                       PLAN:    Patient to be seen 5 x/week  to address the above listed problems via gait training, therapeutic activities, therapeutic exercises  Plan of Care expires: 19  Plan of Care reviewed with: patient         Niles Rutledge, PTA  2019

## 2019-06-29 NOTE — NURSING
Went over discharge instructions with patient.   Stressed importance of making and keeping all follow ups as well as making prescribed medication changes.   Printed prescriptions x2 provided to pt upon discharge.  Patient verbalized understanding and has no questions in regards to discharge.  IV removed, catheter intact.  Telemetry box 5189 removed and returned to monitor tech.  Patient awaiting personal transportation, instructed to call nurse station once ride has arrived.  Primary nurse notified of pt's discharge status.

## 2019-06-29 NOTE — PT/OT/SLP EVAL
Occupational Therapy   Evaluation and Discharge Note    Name: Tyrone Malhotra  MRN: [id number]  Admitting Diagnosis:  Acute right MCA stroke      Recommendations:     Discharge Recommendations: outpatient OT  Discharge Equipment Recommendations:  none  Barriers to discharge:       Assessment:     Tyrone Malhotra is a 38 y.o. male with a medical diagnosis of Acute right MCA stroke. At this time, patient is functioning at their prior level of function and does not require further acute OT services.     Plan:     During this hospitalization, patient does not require further acute OT services.  Please re-consult if situation changes.    · Plan of Care Reviewed with: patient    Subjective     Chief Complaint:   Patient/Family Comments/goals:     Occupational Profile:  Living Environment: lives with girlfriend in 1 story house with no steps to enter  Previous level of function: (I) with adl's and functional mobility. Pt reports still drives  Roles and Routines: occupational therapy   Equipment Used at home:  none  Assistance upon Discharge:    Pain/Comfort:  · Pain Rating 1: 0/10  · Pain Rating Post-Intervention 1: 0/10    Patients cultural, spiritual, Sabianist conflicts given the current situation:      Objective:     Communicated with: nurse and epic chart review prior to session.  Patient found HOB elevated with telemetry upon OT entry to room.    General Precautions: Standard,     Orthopedic Precautions:N/A   Braces: N/A     Occupational Performance:    Bed Mobility:    · Patient completed Rolling/Turning to Right with independence  · Patient completed Scooting/Bridging with independence  · Patient completed Supine to Sit with independence    Functional Mobility/Transfers:  · Patient completed Sit <> Stand Transfer with independence  with  no assistive device   · Patient completed Bed <> Chair Transfer using Step Transfer technique with independence with no assistive device  · Functional Mobility: pt  ambulated 100 feet with s    Activities of Daily Living:  · Upper Body Dressing: contact guard assistance .  · Lower Body Dressing: independence slippers  · Toileting: independence .    Cognitive/Visual Perceptual:  Cognitive/Psychosocial Skills:     -       Oriented to: Person, Place, Time and Situation   -       Follows Commands/attention:Follows multistep  commands  -       Communication: clear/fluent  -       Memory: No Deficits noted  -       Safety awareness/insight to disability: intact   Visual/Perceptual:      -Intact ..    Physical Exam:  Upper Extremity Range of Motion:     -       Right Upper Extremity: WFL  -       Left Upper Extremity: WFL  Upper Extremity Strength:    -       Right Upper Extremity: WFL  -       Left Upper Extremity: mmt: 4/5 grossly   Strength:    -       Right Upper Extremity: WFL  -       Left Upper Extremity: mmt: 4/5 grossly    AMPAC 6 Click ADL:  AMPAC Total Score: 23    Treatment & Education:    Education:    Patient left toileting with all lines intact, call button in reach and . notified    GOALS:   Multidisciplinary Problems     Occupational Therapy Goals     Not on file                History:     Past Medical History:   Diagnosis Date    CHF (congestive heart failure)     Chronic systolic heart failure     Decreased renal function     Diabetes     HTN (hypertension)     Hyperlipemia     Hyperlipidemia     Noncompliance     Obesity     Pulmonary hypertension        Past Surgical History:   Procedure Laterality Date    CARDIAC CATHETERIZATION  12/2018    Normal coronaries    LEFT HEART CATHETERIZATION  2018       Time Tracking:     OT Date of Treatment: 06/29/19  OT Start Time: 1050  OT Stop Time: 1100  OT Total Time (min): 10 min    Billable Minutes:Evaluation 10 minutes    Coty Bull OT  6/29/2019

## 2019-06-29 NOTE — PLAN OF CARE
Problem: Physical Therapy Goal  Goal: Physical Therapy Goal  PT WILL BE SEEN FOR P.T. FOR A MIN OF 5 OUT OF 7 DAYS A WEEK  [id number]  1. PT WILL COMPLETE BED MOBILITY IND  2. PT WILL GT TRAIN X 500' WITH NO AD IND.   3. PT WILL COMPLETE T/F TRAINING IND  4. PT WILL ASCEND AND DESCEND X 1 FLIGHT OF STEPS MOD  I WITH B RAILING.      Outcome: Ongoing (interventions implemented as appropriate)  Increased ambulation distance to 400 feet with SBA. Good carmella and step length observed.

## 2019-06-29 NOTE — DISCHARGE SUMMARY
"[facility] Hospital Medicine  Discharge Summary      Patient Name: Tyrone Malhotra  MRN: [id number]  Admission Date: 6/27/2019  Hospital Length of Stay: 1 days  Discharge Date and Time: 6/29/2019 12:10 PM  Attending Physician: No att. providers found   Discharging Provider: Bert Henning MD  Primary Care Provider: Primary Doctor No      HPI:   Mr. Malhotra is a 38 y.o. male with a PMHx of uncontrolled HTN, nonischemic dilated CMPY, chronic systolic HFrEF of 25-30%, HLD, DM II, CKD, PHTN, obesity and noncompliance.  He presented to the ED with c/o LUE numbness that he describes as a "shocking" sensation, and LUE weakness  x 2 days.  Patient reports pain when lifting left arm that radiates from elbow to fingertips.  He states he is unable to hold anything in his left hand due to "shocking" pain and weakness.  Wife believes patient suffered a heat stroke while walking to work a few days ago.  Wife reports abnormal behavior by the patient, such as putting his socks in the freezer, running into walls, and garbled speech.  No aggravating or alleviating factors.  Symptoms resolved PTA to ED.  Denies any  HA, visual disturbance, lightheadedness, dizziness, syncope, facial droop, drooling, aphasia, dysphagia, seizures, CP, palpitations, SOB, edema, ABD pain, N/V/D, dysuria, back or neck pain, fever or chills.  Patient hypertensive in ED with /112.  Initial work-up resulted cr. 2.2, BUN 28, normal WBC, CXR unremarkable, EKG unrevealing.  CT of the head showed no acute process, right frontal lobe infarct which is either subacute or old.  Hospital Medicine was called for admission.           * No surgery found *      Hospital Course:   Mr Malhotra is a 38 year old male who presented with LUE numbness and weakness X 2 days prior to admission. Symptoms have now improved. CT of the head showed no acute process, right frontal lobe infarct either subacute or old. MRI of the brain showed foci of acute " infarction within the right MCA distribution involving the right frontal lobe. Neurology consulted, patient was evaluated by Dr Stephani narvaez AM, who recommend MRA of the head and neck for further evaluation, continue ASA and Statin. Patient MRA consistent with acute CVA and demonstrating Right MCA near complete occlusion. Patient to follow up with Vascular Neurology and his PCP. Patient evaluated by PT/OT/Speech. All recommending o/p followup. Patient counseled on weight loss and medical compliance. Patient provided prescriptions for all of his medications and again counseled on the need for compliance. Patient seen and examined today prior to his discharge to home.     Consults:   Consults (From admission, onward)        Status Ordering Provider     Inpatient consult to Neurology  Once     Provider:  Stanislaw Correa MD    Acknowledged ZOEY CLEMENT     Inpatient consult to Social Work  Once     Provider:  (Not yet assigned)    Completed YONY DEL ROSARIO     Inpatient consult to Social Work  Once     Provider:  (Not yet assigned)    Acknowledged ZOEY CLEMENT          No new Assessment & Plan notes have been filed under this hospital service since the last note was generated.  Service: Hospital Medicine    Final Active Diagnoses:    Diagnosis Date Noted POA    PRINCIPAL PROBLEM:  Acute right MCA stroke [I63.511] 06/28/2019 Yes    CKD (chronic kidney disease) stage 3, GFR 30-59 ml/min [N18.3] 06/28/2019 Yes     Chronic    Uncontrolled hypertension [I10] 12/20/2018 Yes     Chronic    Chronic systolic HFrEF of 25-30% [I50.22] 12/12/2018 Yes     Chronic    Type 2 diabetes mellitus, with long-term current use of insulin [E11.9, Z79.4] 12/24/2015 Not Applicable     Chronic    Morbid obesity [E66.01] 12/24/2015 Yes      Problems Resolved During this Admission:    Diagnosis Date Noted Date Resolved POA    Acute encephalopathy [G93.40] 06/28/2019 06/29/2019 Yes       Discharged Condition:  "stable    Disposition: Home or Self Care    Follow Up:  Follow-up Information     Jeanne Jackson MD In 1 month.    Specialty:  Neurology  Why:  Hospital follow up   Contact information:  [address]  [facility] [address] [phone]             [facility] In 3 days.    Specialty:  General Practice  Contact information:  [id number]  [facility] [address] [phone]                 Patient Instructions:      Diet diabetic     Diet renal     Activity as tolerated       Significant Diagnostic Studies: Labs:   BMP:   Recent Labs   Lab 06/27/19 2144 06/28/19 0351 06/29/19 0422   * 139* 283*   * 138 132*   K 3.8 4.0 4.2   CL 98 101 98   CO2 26 25 23   BUN 28* 27* 31*   CREATININE 2.2* 2.0* 2.1*   CALCIUM 10.8* 10.2 10.2   MG 1.7 1.4* 1.8   , CMP   Recent Labs   Lab 06/27/19 2144 06/28/19 0351 06/29/19 0422   * 138 132*   K 3.8 4.0 4.2   CL 98 101 98   CO2 26 25 23   * 139* 283*   BUN 28* 27* 31*   CREATININE 2.2* 2.0* 2.1*   CALCIUM 10.8* 10.2 10.2   PROT 7.9  --   --    ALBUMIN 3.5 3.2* 3.1*   BILITOT 0.3  --   --    ALKPHOS 85  --   --    AST 16  --   --    ALT 13  --   --    ANIONGAP 11 12 11   ESTGFRAFRICA 42* 47* 44*   EGFRNONAA 36* 41* 38*   , CBC   Recent Labs   Lab 06/27/19 2144 06/28/19 0351 06/29/19 0422   WBC 6.57 6.07 5.61   HGB 13.2* 12.4* 12.4*   HCT 38.9* 37.8* 37.9*    245 260    and All labs within the past 24 hours have been reviewed    Pending Diagnostic Studies:     None         Medications:  Reconciled Home Medications:      Medication List      START taking these medications    cloNIDine 0.1 MG tablet  Commonly known as:  CATAPRES  Take 1 tablet (0.1 mg total) by mouth 2 (two) times daily.     clopidogrel 75 mg tablet  Commonly known as:  PLAVIX  Take 1 tablet (75 mg total) by mouth once daily.  Start taking on:  6/30/2019     pen needle, diabetic 31 gauge x 3/16" Ndle  15 mLs by Misc.(Non-Drug; Combo Route) route every evening.        CHANGE " how you take these medications    amLODIPine 10 MG tablet  Commonly known as:  NORVASC  Take 1 tablet (10 mg total) by mouth once daily.  What changed:  when to take this     atorvastatin 20 MG tablet  Commonly known as:  LIPITOR  Take 1 tablet (20 mg total) by mouth once daily.  What changed:  when to take this     carvedilol 25 MG tablet  Commonly known as:  COREG  Take 1 tablet (25 mg total) by mouth 2 (two) times daily.  What changed:  when to take this     furosemide 40 MG tablet  Commonly known as:  LASIX  Take 1 tablet (40 mg total) by mouth once daily.  What changed:  when to take this     insulin aspart U-100 100 unit/mL (3 mL) Inpn pen  Commonly known as:  NovoLOG Flexpen U-100 Insulin  Inject 15 Units into the skin 3 (three) times daily with meals.  What changed:  when to take this     insulin detemir U-100 100 unit/mL injection  Commonly known as:  LEVEMIR U-100 INSULIN  Inject 28 Units into the skin every evening.  What changed:  when to take this     losartan 50 MG tablet  Commonly known as:  COZAAR  Take 1 tablet (50 mg total) by mouth once daily.  What changed:  when to take this        CONTINUE taking these medications    aspirin 81 MG EC tablet  Commonly known as:  ECOTRIN  Take 81 mg by mouth.     hydroCHLOROthiazide 12.5 mg capsule  Commonly known as:  MICROZIDE  Take 12.5 mg by mouth once daily.            Indwelling Lines/Drains at time of discharge:   Lines/Drains/Airways          None          Time spent on the discharge of patient: 45 minutes  Patient was seen and examined on the date of discharge and determined to be suitable for discharge.         Bert Henning MD  Department of Hospital Medicine  [facility]

## 2019-06-29 NOTE — PLAN OF CARE
Problem: Adult Inpatient Plan of Care  Goal: Plan of Care Review  Outcome: Outcome(s) achieved Date Met: 06/29/19  Patient discharged to home. PIV removed. Tele-monitor removed. Discharge instructions reviewed with pt.

## 2019-07-01 ENCOUNTER — PATIENT OUTREACH (OUTPATIENT)
Dept: ADMINISTRATIVE | Facility: CLINIC | Age: 39
End: 2019-07-01

## 2019-07-01 NOTE — PATIENT INSTRUCTIONS
Diet: Diabetes  Food is an important tool that you can use to control diabetes and stay healthy. Eating well-balanced meals in the correct amounts will help you control your blood glucose levels and prevent low blood sugar reactions. It will also help you reduce the health risks of diabetes. There is no one specific diet that is right for everyone with diabetes. But there are general guidelines to follow. A registered dietitian (RD) will create a tailored diet approach thats just right for you. He or she will also help you plan healthy meals and snacks. If you have any questions, call your dietitian for advice.     Guidelines for success  Talk with your healthcare provider before starting a diabetes diet or weight loss program. If you haven't talked with a dietitian yet, ask your provider for a referral. The following guidelines can help you succeed:  · Select foods from the 6 food groups below. Your dietitian will help you find food choices within each group. He or she will also show you serving sizes and how many servings you can have at each meal.  ¨ Grains, beans, and starchy vegetables  ¨ Vegetables  ¨ Fruit  ¨ Milk or yogurt  ¨ Meat, poultry, fish, or tofu  ¨ Healthy fats  · Check your blood sugar levels as directed by your provider. Take any medicine as prescribed by your provider.  · Learn to read food labels and pick the right portion sizes.  · Eat only the amount of food in your meal plan. Eat about the same amount of food at regular times each day. Dont skip meals. Eat meals 4 to 5 hours apart, with snacks in between.  · Limit alcohol. It raises blood sugar levels. Drink water or calorie-free diet drinks that use safe sweeteners.  · Eat less fat to help lower your risk of heart disease. Use nonfat or low-fat dairy products and lean meats. Avoid fried foods. Use cooking oils that are unsaturated, such as olive, canola, or peanut oil.  · Talk with your dietitian about safe sugar substitutes.  · Avoid  added salt. It can contribute to high blood pressure, which can cause heart disease. People with diabetes already have a risk of high blood pressure and heart disease.  · Stay at a healthy weight. If you need to lose weight, cut down on your portion sizes. But dont skip meals. Exercise is an important part of any weight management program. Talk with your provider about an exercise program thats right for you.  · For more information about the best diet plan for you, talk with a registered dietitian (RD). To find an RD in your area, contact:  ¨ Academy of Nutrition and Dietetics www.eatright.org  ¨ The American Diabetes Association 917-504-9205 www.diabetes.org  Date Last Reviewed: 8/1/2016 © 2000-2017 [employer] [address] [place] [address], [place] [address]. All rights reserved. This information is not intended as a substitute for professional medical care. Always follow your healthcare professional's instructions.

## 2019-07-01 NOTE — PLAN OF CARE
07/01/19 0857   Final Note   Assessment Type Final Discharge Note   Anticipated Discharge Disposition Home   Right Care Referral Info   Post Acute Recommendation No Care

## 2025-02-19 NOTE — ASSESSMENT & PLAN NOTE
- Admit to Inpatient  -Patient's LUE pain and weakness is more consistent with musculoskeletal etiology/epicondylitis.  - Symptoms have imporved since admission   - CT of the head showed right frontal lobe infarct, most likely old.   - MRI of brain showes Foci of acute infarction within the right MCA distribution involving the right frontal lobe.  -Carotid US showed no significant stenosis.  -2D echo normal left ventricular systolic function (EF 60-65%) and impaired LV relaxation, normal LAP .  -  HbA1c 9.7    - Continue ASA and statin therapy.  -Consult Neurology   -MRA of brain and neck   -PT/OT/Speech    No No No No No No No No No No No No No No No No No No